# Patient Record
Sex: MALE | Race: BLACK OR AFRICAN AMERICAN | NOT HISPANIC OR LATINO | Employment: FULL TIME | URBAN - METROPOLITAN AREA
[De-identification: names, ages, dates, MRNs, and addresses within clinical notes are randomized per-mention and may not be internally consistent; named-entity substitution may affect disease eponyms.]

---

## 2022-12-23 ENCOUNTER — OFFICE VISIT (OUTPATIENT)
Dept: URGENT CARE | Facility: CLINIC | Age: 22
End: 2022-12-23
Payer: COMMERCIAL

## 2022-12-23 VITALS
TEMPERATURE: 98 F | HEART RATE: 64 BPM | WEIGHT: 124 LBS | OXYGEN SATURATION: 100 % | HEIGHT: 66 IN | SYSTOLIC BLOOD PRESSURE: 135 MMHG | DIASTOLIC BLOOD PRESSURE: 84 MMHG | RESPIRATION RATE: 18 BRPM | BODY MASS INDEX: 19.93 KG/M2

## 2022-12-23 DIAGNOSIS — S05.02XA ABRASION OF LEFT CORNEA, INITIAL ENCOUNTER: Primary | ICD-10-CM

## 2022-12-23 PROCEDURE — 99204 OFFICE O/P NEW MOD 45 MIN: CPT | Mod: PBBFAC | Performed by: FAMILY MEDICINE

## 2022-12-23 PROCEDURE — 99214 PR OFFICE/OUTPT VISIT, EST, LEVL IV, 30-39 MIN: ICD-10-PCS | Mod: S$PBB,,, | Performed by: FAMILY MEDICINE

## 2022-12-23 PROCEDURE — 99214 OFFICE O/P EST MOD 30 MIN: CPT | Mod: S$PBB,,, | Performed by: FAMILY MEDICINE

## 2022-12-23 RX ORDER — CIPROFLOXACIN HYDROCHLORIDE 3 MG/ML
1 SOLUTION/ DROPS OPHTHALMIC EVERY 4 HOURS
Qty: 5 ML | Refills: 0 | Status: SHIPPED | OUTPATIENT
Start: 2022-12-23

## 2022-12-23 NOTE — PROGRESS NOTES
"Subjective:       Patient ID: Daniele Vernon is a 22 y.o. male.    Chief Complaint: Eye Pain (Scratched eye today, states feels like something is in his eye)      HPI  21 yo male with left eye pain since earlier today.  Scratched his eye with phone .  Foreign body sensation.  Difficult to keep eye open.    Review of Systems   Eyes:         As above       Objective:       Vital Signs  Temp: 98.4 °F (36.9 °C)  Pulse: 64  Resp: 18  SpO2: 100 %  BP: 135/84  BP Location: Right arm  Patient Position: Sitting  Height and Weight  Height: 5' 6" (167.6 cm)  Weight: 56.2 kg (124 lb)  BSA (Calculated - sq m): 1.62 sq meters  BMI (Calculated): 20  Weight in (lb) to have BMI = 25: 154.6]  Physical Exam  Vitals reviewed.   Constitutional:       Appearance: Normal appearance.   HENT:      Head: Normocephalic and atraumatic.   Eyes:      Extraocular Movements: Extraocular movements intact.      Comments: Left eye displays injected sclera.  Examination limited by patient cooperation.  No foreign bodies upon cursory inspection.    Skin:     General: Skin is warm and dry.   Neurological:      Mental Status: He is alert.   Psychiatric:         Mood and Affect: Mood normal.         Behavior: Behavior normal.       Assessment:       Problem List Items Addressed This Visit    None  Visit Diagnoses       Abrasion of left cornea, initial encounter    -  Primary    Relevant Medications    ciprofloxacin HCl (CILOXAN) 0.3 % ophthalmic solution              Plan:   Encouraged to use lubricating eye gel as needed before sleep  Strict ER precautions for changes in vision or worsening of symptoms  FU with PCP   "

## 2022-12-23 NOTE — LETTER
December 23, 2022    Daniele Vernon  [address]  [facility] [address]             [facility]  Urgent Care  [address] [facility] [address]  Phone: [phone]   December 23, 2022     Patient: Daniele Vernon   YOB: 2000   Date of Visit: 12/23/2022       To Whom it May Concern:    Daniele Vernon was seen in my clinic on 12/23/2022. He may return to work on 12/25/2022.    Please excuse him from any classes or work missed.    If you have any questions or concerns, please don't hesitate to call.    Sincerely,         Corey Cortez MD

## 2025-02-26 ENCOUNTER — HOSPITAL ENCOUNTER (EMERGENCY)
Facility: HOSPITAL | Age: 25
Discharge: PSYCHIATRIC OR SUBSTANCE USE FACILITY | End: 2025-02-26
Attending: EMERGENCY MEDICINE
Payer: COMMERCIAL

## 2025-02-26 ENCOUNTER — HOSPITAL ENCOUNTER (INPATIENT)
Facility: HOSPITAL | Age: 25
LOS: 6 days | Discharge: HOME | DRG: 881 | End: 2025-03-04
Attending: PSYCHIATRY & NEUROLOGY | Admitting: PSYCHIATRY & NEUROLOGY
Payer: COMMERCIAL

## 2025-02-26 VITALS
OXYGEN SATURATION: 100 % | TEMPERATURE: 98 F | SYSTOLIC BLOOD PRESSURE: 140 MMHG | DIASTOLIC BLOOD PRESSURE: 76 MMHG | BODY MASS INDEX: 20.04 KG/M2 | HEART RATE: 88 BPM | HEIGHT: 66 IN | WEIGHT: 124.69 LBS | RESPIRATION RATE: 18 BRPM

## 2025-02-26 DIAGNOSIS — F32.A DEPRESSION: ICD-10-CM

## 2025-02-26 DIAGNOSIS — R45.851 SUICIDAL IDEATION: Primary | ICD-10-CM

## 2025-02-26 LAB
ACCEPTIBLE SP GR UR QL: 1.02 (ref 1–1.03)
ALBUMIN SERPL-MCNC: 4.6 G/DL (ref 3.5–5)
ALBUMIN/GLOB SERPL: 1.7 RATIO (ref 1.1–2)
ALP SERPL-CCNC: 62 UNIT/L (ref 40–150)
ALT SERPL-CCNC: 21 UNIT/L (ref 0–55)
AMPHET UR QL SCN: NEGATIVE
ANION GAP SERPL CALC-SCNC: 6 MEQ/L
APAP SERPL-MCNC: <3 UG/ML (ref 10–30)
AST SERPL-CCNC: 22 UNIT/L (ref 5–34)
BACTERIA #/AREA URNS AUTO: ABNORMAL /HPF
BARBITURATE SCN PRESENT UR: NEGATIVE
BASOPHILS # BLD AUTO: 0.06 X10(3)/MCL
BASOPHILS NFR BLD AUTO: 0.7 %
BENZODIAZ UR QL SCN: NEGATIVE
BILIRUB SERPL-MCNC: 0.5 MG/DL
BILIRUB UR QL STRIP.AUTO: NEGATIVE
BUN SERPL-MCNC: 14.3 MG/DL (ref 8.9–20.6)
CALCIUM SERPL-MCNC: 9.2 MG/DL (ref 8.4–10.2)
CANNABINOIDS UR QL SCN: NEGATIVE
CHLORIDE SERPL-SCNC: 108 MMOL/L (ref 98–107)
CLARITY UR: ABNORMAL
CO2 SERPL-SCNC: 27 MMOL/L (ref 22–29)
COCAINE UR QL SCN: NEGATIVE
COLOR UR AUTO: ABNORMAL
CREAT SERPL-MCNC: 0.83 MG/DL (ref 0.72–1.25)
CREAT/UREA NIT SERPL: 17
EOSINOPHIL # BLD AUTO: 0.03 X10(3)/MCL (ref 0–0.9)
EOSINOPHIL NFR BLD AUTO: 0.4 %
ERYTHROCYTE [DISTWIDTH] IN BLOOD BY AUTOMATED COUNT: 12 % (ref 11.5–17)
ETHANOL SERPL-MCNC: <10 MG/DL
FENTANYL UR QL SCN: NEGATIVE
GFR SERPLBLD CREATININE-BSD FMLA CKD-EPI: >60 ML/MIN/1.73/M2
GLOBULIN SER-MCNC: 2.7 GM/DL (ref 2.4–3.5)
GLUCOSE SERPL-MCNC: 88 MG/DL (ref 74–100)
GLUCOSE UR QL STRIP: NORMAL
HCT VFR BLD AUTO: 42.6 % (ref 42–52)
HGB BLD-MCNC: 14.5 G/DL (ref 14–18)
HGB UR QL STRIP: NEGATIVE
HYALINE CASTS #/AREA URNS LPF: ABNORMAL /LPF
IMM GRANULOCYTES # BLD AUTO: 0.03 X10(3)/MCL (ref 0–0.04)
IMM GRANULOCYTES NFR BLD AUTO: 0.4 %
KETONES UR QL STRIP: NEGATIVE
LEUKOCYTE ESTERASE UR QL STRIP: NEGATIVE
LYMPHOCYTES # BLD AUTO: 1.81 X10(3)/MCL (ref 0.6–4.6)
LYMPHOCYTES NFR BLD AUTO: 22.3 %
MCH RBC QN AUTO: 29.5 PG (ref 27–31)
MCHC RBC AUTO-ENTMCNC: 34 G/DL (ref 33–36)
MCV RBC AUTO: 86.8 FL (ref 80–94)
MDMA UR QL SCN: NEGATIVE
MONOCYTES # BLD AUTO: 0.38 X10(3)/MCL (ref 0.1–1.3)
MONOCYTES NFR BLD AUTO: 4.7 %
MUCOUS THREADS URNS QL MICRO: ABNORMAL /LPF
NEUTROPHILS # BLD AUTO: 5.79 X10(3)/MCL (ref 2.1–9.2)
NEUTROPHILS NFR BLD AUTO: 71.5 %
NITRITE UR QL STRIP: NEGATIVE
NRBC BLD AUTO-RTO: 0 %
OPIATES UR QL SCN: NEGATIVE
PCP UR QL: NEGATIVE
PH UR STRIP: 7.5 [PH]
PH UR: 7.5 [PH] (ref 3–11)
PLATELET # BLD AUTO: 244 X10(3)/MCL (ref 130–400)
PMV BLD AUTO: 9.8 FL (ref 7.4–10.4)
POTASSIUM SERPL-SCNC: 3.5 MMOL/L (ref 3.5–5.1)
PROT SERPL-MCNC: 7.3 GM/DL (ref 6.4–8.3)
PROT UR QL STRIP: NEGATIVE
RBC # BLD AUTO: 4.91 X10(6)/MCL (ref 4.7–6.1)
RBC #/AREA URNS AUTO: ABNORMAL /HPF
SALICYLATES SERPL-MCNC: <5 MG/DL (ref 15–30)
SODIUM SERPL-SCNC: 141 MMOL/L (ref 136–145)
SP GR UR STRIP.AUTO: 1.02 (ref 1–1.03)
SQUAMOUS #/AREA URNS LPF: ABNORMAL /HPF
UROBILINOGEN UR STRIP-ACNC: NORMAL
WBC # BLD AUTO: 8.1 X10(3)/MCL (ref 4.5–11.5)
WBC #/AREA URNS AUTO: ABNORMAL /HPF

## 2025-02-26 PROCEDURE — 99285 EMERGENCY DEPT VISIT HI MDM: CPT

## 2025-02-26 PROCEDURE — 80307 DRUG TEST PRSMV CHEM ANLYZR: CPT | Performed by: EMERGENCY MEDICINE

## 2025-02-26 PROCEDURE — 11400000 HC PSYCH PRIVATE ROOM

## 2025-02-26 PROCEDURE — 80179 DRUG ASSAY SALICYLATE: CPT | Performed by: EMERGENCY MEDICINE

## 2025-02-26 PROCEDURE — 81001 URINALYSIS AUTO W/SCOPE: CPT | Performed by: EMERGENCY MEDICINE

## 2025-02-26 PROCEDURE — 80053 COMPREHEN METABOLIC PANEL: CPT | Performed by: EMERGENCY MEDICINE

## 2025-02-26 PROCEDURE — 80143 DRUG ASSAY ACETAMINOPHEN: CPT | Performed by: EMERGENCY MEDICINE

## 2025-02-26 PROCEDURE — 82077 ASSAY SPEC XCP UR&BREATH IA: CPT | Performed by: EMERGENCY MEDICINE

## 2025-02-26 PROCEDURE — 85025 COMPLETE CBC W/AUTO DIFF WBC: CPT | Performed by: EMERGENCY MEDICINE

## 2025-02-26 RX ORDER — LORAZEPAM 2 MG/ML
2 INJECTION INTRAMUSCULAR EVERY 6 HOURS PRN
Status: DISCONTINUED | OUTPATIENT
Start: 2025-02-26 | End: 2025-03-04 | Stop reason: HOSPADM

## 2025-02-26 RX ORDER — ACETAMINOPHEN 325 MG/1
650 TABLET ORAL EVERY 6 HOURS PRN
Status: DISCONTINUED | OUTPATIENT
Start: 2025-02-26 | End: 2025-03-04 | Stop reason: HOSPADM

## 2025-02-26 RX ORDER — ALUMINUM HYDROXIDE, MAGNESIUM HYDROXIDE, AND SIMETHICONE 1200; 120; 1200 MG/30ML; MG/30ML; MG/30ML
30 SUSPENSION ORAL EVERY 6 HOURS PRN
Status: DISCONTINUED | OUTPATIENT
Start: 2025-02-26 | End: 2025-03-04 | Stop reason: HOSPADM

## 2025-02-26 RX ORDER — IBUPROFEN 200 MG
1 TABLET ORAL DAILY
Status: DISCONTINUED | OUTPATIENT
Start: 2025-02-26 | End: 2025-03-04 | Stop reason: HOSPADM

## 2025-02-26 RX ORDER — DIPHENHYDRAMINE HYDROCHLORIDE 50 MG/ML
50 INJECTION, SOLUTION INTRAMUSCULAR; INTRAVENOUS EVERY 6 HOURS PRN
Status: DISCONTINUED | OUTPATIENT
Start: 2025-02-26 | End: 2025-03-04 | Stop reason: HOSPADM

## 2025-02-26 RX ORDER — HYDROXYZINE HYDROCHLORIDE 50 MG/1
50 TABLET, FILM COATED ORAL EVERY 4 HOURS PRN
Status: DISCONTINUED | OUTPATIENT
Start: 2025-02-26 | End: 2025-03-04 | Stop reason: HOSPADM

## 2025-02-26 RX ORDER — LORAZEPAM 1 MG/1
2 TABLET ORAL EVERY 6 HOURS PRN
Status: DISCONTINUED | OUTPATIENT
Start: 2025-02-26 | End: 2025-03-04 | Stop reason: HOSPADM

## 2025-02-26 RX ORDER — HALOPERIDOL 5 MG/1
5 TABLET ORAL EVERY 6 HOURS PRN
Status: DISCONTINUED | OUTPATIENT
Start: 2025-02-26 | End: 2025-03-04 | Stop reason: HOSPADM

## 2025-02-26 RX ORDER — DIPHENHYDRAMINE HCL 50 MG
50 CAPSULE ORAL EVERY 6 HOURS PRN
Status: DISCONTINUED | OUTPATIENT
Start: 2025-02-26 | End: 2025-03-04 | Stop reason: HOSPADM

## 2025-02-26 RX ORDER — TRAZODONE HYDROCHLORIDE 100 MG/1
100 TABLET ORAL NIGHTLY PRN
Status: DISCONTINUED | OUTPATIENT
Start: 2025-02-26 | End: 2025-03-04 | Stop reason: HOSPADM

## 2025-02-26 RX ORDER — CLONIDINE HYDROCHLORIDE 0.1 MG/1
0.1 TABLET ORAL EVERY 8 HOURS PRN
Status: DISCONTINUED | OUTPATIENT
Start: 2025-02-26 | End: 2025-03-04 | Stop reason: HOSPADM

## 2025-02-26 RX ORDER — HALOPERIDOL LACTATE 5 MG/ML
5 INJECTION, SOLUTION INTRAMUSCULAR EVERY 6 HOURS PRN
Status: DISCONTINUED | OUTPATIENT
Start: 2025-02-26 | End: 2025-03-04 | Stop reason: HOSPADM

## 2025-02-26 RX ORDER — CLONIDINE HYDROCHLORIDE 0.1 MG/1
0.2 TABLET ORAL EVERY 8 HOURS PRN
Status: DISCONTINUED | OUTPATIENT
Start: 2025-02-26 | End: 2025-03-04 | Stop reason: HOSPADM

## 2025-02-26 RX ORDER — ONDANSETRON 4 MG/1
4 TABLET, ORALLY DISINTEGRATING ORAL EVERY 6 HOURS PRN
Status: DISCONTINUED | OUTPATIENT
Start: 2025-02-26 | End: 2025-03-04 | Stop reason: HOSPADM

## 2025-02-26 RX ORDER — ADHESIVE BANDAGE
30 BANDAGE TOPICAL DAILY PRN
Status: DISCONTINUED | OUTPATIENT
Start: 2025-02-26 | End: 2025-03-04 | Stop reason: HOSPADM

## 2025-02-26 NOTE — PLAN OF CARE
Problem: Adult Behavioral Health Plan of Care  Goal: Plan of Care Review  Outcome: Not Progressing  Flowsheets (Taken 2/26/2025 1544)  Patient Agreement with Plan of Care: unable to participate  Plan of Care Reviewed With: patient  Goal: Patient-Specific Goal (Individualization)  Outcome: Not Progressing  Flowsheets (Taken 2/26/2025 1544)  Patient Personal Strengths: independent living skills  Patient Vulnerabilities: family/relationship conflict  Goal: Adheres to Safety Considerations for Self and Others  Outcome: Not Progressing  Flowsheets (Taken 2/26/2025 1544)  Adheres to Safety Considerations for Self and Others: unable to achieve outcome  Intervention: Develop and Maintain Individualized Safety Plan  Flowsheets (Taken 2/26/2025 1544)  Safety Measures: safety rounds completed  Goal: Absence of New-Onset Illness or Injury  Outcome: Not Progressing  Intervention: Identify and Manage Fall Risk  Flowsheets (Taken 2/26/2025 1544)  Safety Measures: safety rounds completed  Intervention: Prevent VTE (Venous Thromboembolism)  Flowsheets (Taken 2/26/2025 1544)  VTE Prevention/Management: fluids promoted  Intervention: Prevent Infection  Flowsheets (Taken 2/26/2025 1544)  Infection Prevention: rest/sleep promoted  Goal: Optimized Coping Skills in Response to Life Stressors  Outcome: Not Progressing  Flowsheets (Taken 2/26/2025 1544)  Optimized Coping Skills in Response to Life Stressors: unable to achieve outcome  Intervention: Promote Effective Coping Strategies  Flowsheets (Taken 2/26/2025 1544)  Supportive Measures: self-care encouraged  Goal: Develops/Participates in Therapeutic [place] to Support Successful Transition  Outcome: Not Progressing  Flowsheets (Taken 2/26/2025 1544)  Develops/Participates in Therapeutic [place] to Support Successful Transition: unable to achieve outcome  Intervention: Foster Therapeutic [place]  Flowsheets (Taken 2/26/2025 1544)  Trust Relationship/Rapport: care explained  Goal:  Rounds/Family Conference  Outcome: Not Progressing  Flowsheets (Taken 2/26/2025 1544)  Participants:   nursing   milieu/psych techs     Problem: Violence Risk or Actual  Goal: Anger and Impulse Control  Outcome: Not Progressing  Intervention: Minimize Safety Risk  Flowsheets (Taken 2/26/2025 1544)  Behavior Management: behavioral plan developed  Sensory Stimulation Regulation: quiet environment promoted  De-Escalation Techniques: quiet time facilitated  Intervention: Promote Self-Control  Flowsheets (Taken 2/26/2025 1544)  Supportive Measures: self-care encouraged  Environmental Support: calm environment promoted     Problem: Depressive Signs/Symptoms  Goal: Optimized Energy Level (Depressive Signs/Symptoms)  Outcome: Not Progressing  Flowsheets (Taken 2/26/2025 1544)  Mutually Determined Action Steps (Optimized Energy Level): grooms self without prompting  Intervention: Optimize Energy Level  Flowsheets (Taken 2/26/2025 1544)  Activity (Behavioral Health): up ad samuel  Patient Performed Hygiene: teeth brushed  Diversional Activity: television  Goal: Optimized Cognitive Function (Depressive Signs/Symptoms)  Outcome: Not Progressing  Flowsheets (Taken 2/26/2025 1544)  Mutually Determined Action Steps (Optimized Cognitive Function): participates in attention training  Goal: Increased Participation and Engagement (Depressive Signs/Symptoms)  Outcome: Not Progressing  Flowsheets (Taken 2/26/2025 1544)  Mutually Determined Action Steps (Increased Participation and Engagement): participates in one or more activity  Intervention: Facilitate Participation and Engagement  Flowsheets (Taken 2/26/2025 1544)  Supportive Measures: self-care encouraged  Diversional Activity: television  Goal: Enhanced Self-Esteem and Confidence (Depressive Signs/Symptoms)  Outcome: Not Progressing  Flowsheets (Taken 2/26/2025 1544)  Mutually Determined Action Steps (Enhanced Self-Esteem and Confidence): identifies judgmental thoughts  Intervention:  Promote Confidence and Self-Esteem  Flowsheets (Taken 2/26/2025 1544)  Supportive Measures: self-care encouraged  Goal: Improved Mood Symptoms (Depressive Signs/Symptoms)  Outcome: Not Progressing  Flowsheets (Taken 2/26/2025 1544)  Mutually Determined Action Steps (Improved Mood Symptoms): acknowledges progress  Intervention: Promote Mood Improvement  Flowsheets (Taken 2/26/2025 1544)  Supportive Measures: self-care encouraged  Diversional Activity: television  Goal: Optimized Nutrition Intake (Depressive Signs/Symptoms)  Outcome: Not Progressing  Flowsheets (Taken 2/26/2025 1544)  Mutually Determined Action Steps (Optimized Nutrition Intake): eats at meal time  Intervention: Promote Optimal Nutrition Intake  Flowsheets (Taken 2/26/2025 1544)  Nutrition Interventions: food preferences provided  Bowel Elimination Promotion: adequate fluid intake promoted  Goal: Improved Psychomotor Symptoms (Depressive Signs/Symptoms)  Outcome: Not Progressing  Flowsheets (Taken 2/26/2025 1544)  Mutually Determined Action Steps (Improved Psychomotor Symptoms): exhibits decrease in agitation  Intervention: Manage Psychomotor Movement  Flowsheets (Taken 2/26/2025 1544)  Activity (Behavioral Health): up ad samuel  Patient Performed Hygiene: teeth brushed  Diversional Activity: television  Goal: Improved Sleep (Depressive Signs/Symptoms)  Outcome: Not Progressing  Flowsheets (Taken 2/26/2025 1544)  Mutually Determined Action Steps (Improved Sleep): sleeps 4-6 hours at night  Intervention: Promote Healthy Sleep Hygiene  Flowsheets (Taken 2/26/2025 1544)  Sleep Hygiene Promotion: regular sleep pattern promoted  Goal: Enhanced Social, Occupational or Functional Skills (Depressive Signs/Symptoms)  Outcome: Not Progressing  Flowsheets (Taken 2/26/2025 1544)  Mutually Determined Action Steps (Enhanced Social, Occupational or Functional Skills): participates in social skills training  Intervention: Promote Social, Occupational and Functional  Ability  Flowsheets (Taken 2/26/2025 3348)  Social Functional Ability Promotion: autonomy promoted

## 2025-02-26 NOTE — NURSING
"Admission Note:    Daniele Vernon is a 24 y.o. male, : 2000, MRN: [id number], admitted on 2025 to [facility] ([facility]) for Pierre Roy MD with a diagnosis of Depression [F32.A]. Patient admitted on a status of Physician Emergency Certificate (PEC). Daniele reports no known food or drug allergies.    Patient demonstrated an affect that was sad, flat, anxious, and irritable. Patient demonstrated mood during assessment that was depressed, anxious, and swings. Patient had an appearance that was clean.  Patient endorses suicidal ideation. Patient denies suicide plan. Patient denies hallucinations. He is a PEC from [facility] ED. Presents with a depressed mood with SI (no plan). UDS negative. Cooperative on admit.     Alyssas  height is 5' 6" (1.676 m) and weight is 54.4 kg (120 lb). His oral temperature is 97.9 °F (36.6 °C). His blood pressure is 145/83 (abnormal) and his pulse is 75. His respiration is 20 and oxygen saturation is 100%.     Daniele's last BM was noted on: 25.    Metal detector screening performed via security personnel. The result of the scan was no contraband found. Head-to-toe physical assessment completed with the following findings: no wounds found upon body screen. A full skin assessment was performed. Alyssas skin appeared warm,dry, and intact.  Daniele was oriented to unit, staff, peers, and room. Patient belongings/valuables stored in locked intake room cabinet and changes of clothing provided to patient. Daniele was placed on Q 15 min observations.       "

## 2025-02-26 NOTE — ED PROVIDER NOTES
Encounter Date: 2/26/2025       History     Chief Complaint   Patient presents with    Psychiatric Evaluation     Here for psych Eval; +SI, no plan, denies HI, AH, VH     Patient is here with suicidal ideation, no clearly developed plan though some guarding.  Patient acknowledges hopelessness, or orientation toward future.  Patient does not appear intoxicated and was not responsive to internal stimuli.  There is no obvious paranoia nor delusions.  Patient with some affect of constriction.        Review of patient's allergies indicates:  No Known Allergies  History reviewed. No pertinent past medical history.  History reviewed. No pertinent surgical history.  No family history on file.  Social History[1]  Review of Systems    Physical Exam     Initial Vitals [02/26/25 1151]   BP Pulse Resp Temp SpO2   (!) 173/76 93 16 98.2 °F (36.8 °C) 100 %      MAP       --         Physical Exam    Nursing note and vitals reviewed.  Constitutional: He appears well-developed and well-nourished. He is not diaphoretic. No distress.   HENT:   Head: Normocephalic and atraumatic.   Eyes: EOM are normal. Pupils are equal, round, and reactive to light. Right eye exhibits no discharge. Left eye exhibits no discharge.   Neck: Neck supple. No thyromegaly present. No tracheal deviation present. No JVD present.   Normal range of motion.  Cardiovascular:  Normal rate, regular rhythm, normal heart sounds and intact distal pulses.           No murmur heard.  Pulmonary/Chest: Breath sounds normal. No stridor. No respiratory distress. He has no wheezes. He has no rhonchi. He has no rales.   Abdominal: Abdomen is soft. He exhibits no distension. There is no abdominal tenderness. There is no rebound and no guarding.   Musculoskeletal:         General: No tenderness or edema. Normal range of motion.      Cervical back: Normal range of motion and neck supple.     Neurological: He is alert and oriented to person, place, and time. He has normal strength.  No cranial nerve deficit. GCS score is 15. GCS eye subscore is 4. GCS verbal subscore is 5. GCS motor subscore is 6.   Skin: Skin is warm and dry. Capillary refill takes less than 2 seconds. No rash and no abscess noted. No erythema. No pallor.   Psychiatric:   Affect of flattening, suicidal ideation, impaired impulse control and judgment;         ED Course   Procedures  Labs Reviewed   URINALYSIS, REFLEX TO URINE CULTURE - Abnormal       Result Value    Color, UA Light-Yellow      Appearance, UA Turbid (*)     Specific Gravity, UA 1.022      pH, UA 7.5      Protein, UA Negative      Glucose, UA Normal      Ketones, UA Negative      Blood, UA Negative      Bilirubin, UA Negative      Urobilinogen, UA Normal      Nitrites, UA Negative      Leukocyte Esterase, UA Negative      RBC, UA 0-5      WBC, UA 0-5      Bacteria, UA None Seen      Squamous Epithelial Cells, UA None Seen      Mucous, UA Trace (*)     Hyaline Casts, UA None Seen     SALICYLATE LEVEL - Abnormal    Salicylate Level <5.0 (*)    ACETAMINOPHEN LEVEL - Abnormal    Acetaminophen Level <3.0 (*)    COMPREHENSIVE METABOLIC PANEL - Abnormal    Sodium 141      Potassium 3.5      Chloride 108 (*)     CO2 27      Glucose 88      Blood Urea Nitrogen 14.3      Creatinine 0.83      Calcium 9.2      Protein Total 7.3      Albumin 4.6      Globulin 2.7      Albumin/Globulin Ratio 1.7      Bilirubin Total 0.5      ALP 62      ALT 21      AST 22      eGFR >60      Anion Gap 6.0      BUN/Creatinine Ratio 17     DRUG SCREEN, URINE (BEAKER) - Normal    Amphetamines, Urine Negative      Barbiturates, Urine Negative      Benzodiazepine, Urine Negative      Cannabinoids, Urine Negative      Cocaine, Urine Negative      Fentanyl, Urine Negative      MDMA, Urine Negative      Opiates, Urine Negative      Phencyclidine, Urine Negative      pH, Urine 7.5      Specific Gravity, Urine Auto 1.022      Narrative:     Cut off concentrations:    Amphetamines - 1000  ng/ml  Barbiturates - 200 ng/ml  Benzodiazepine - 200 ng/ml  Cannabinoids (THC) - 50 ng/ml  Cocaine - 300 ng/ml  Fentanyl - 1.0 ng/ml  MDMA - 500 ng/ml  Opiates - 300 ng/ml   Phencyclidine (PCP) - 25 ng/ml    Specimen submitted for drug analysis and tested for pH and specific gravity in order to evaluate sample integrity. Suspect tampering if specific gravity is <1.003 and/or pH is not within the range of 4.5 - 8.0  False negatives may result form substances such as bleach added to urine.  False positives may result for the presence of a substance with similar chemical structure to the drug or its metabolite.    This test provides only a PRELIMINARY analytical test result. A more specific alternate chemical method must be used in order to obtain a confirmed analytical result. Gas chromatography/mass spectrometry (GC/MS) is the preferred confirmatory method. Other chemical confirmation methods are available. Clinical consideration and professional judgement should be applied to any drug of abuse test result, particularly when preliminary positive results are used.    Positive results will be confirmed only at the physicians request. Unconfirmed screening results are to be used only for medical purposes (treatment).        ALCOHOL,MEDICAL (ETHANOL) - Normal    Ethanol Level <10.0     CBC W/ AUTO DIFFERENTIAL    Narrative:     The following orders were created for panel order CBC Auto Differential.  Procedure                               Abnormality         Status                     ---------                               -----------         ------                     CBC with Differential[[id number]]                           Final result                 Please view results for these tests on the individual orders.   CBC WITH DIFFERENTIAL    WBC 8.10      RBC 4.91      Hgb 14.5      Hct 42.6      MCV 86.8      MCH 29.5      MCHC 34.0      RDW 12.0      Platelet 244      MPV 9.8      Neut % 71.5      Lymph % 22.3       Mono % 4.7      Eos % 0.4      Basophil % 0.7      Imm Grans % 0.4      Neut # 5.79      Lymph # 1.81      Mono # 0.38      Eos # 0.03      Baso # 0.06      Imm Gran # 0.03      NRBC% 0.0            Imaging Results    None          Medications - No data to display  Medical Decision Making  Features most compatible with a major depression and accompanying suicidal ideation    Amount and/or Complexity of Data Reviewed  External Data Reviewed: notes.  Labs: ordered. Decision-making details documented in ED Course.     Details: As above;  Discussion of management or test interpretation with external provider(s): Formal mental health evaluation;    Risk  Decision regarding hospitalization.  Risk Details: Risk found sufficient to warrant formal mental health evaluation.  Patient achieves medical clearance and is referred for psychiatry services.               ED Course as of 02/26/25 1559   Wed Feb 26, 2025   1251 Reassuring hemogram; [CT]   1251 Reassuring chemistries; [CT]   1252 Negative Tylenol, ethanol, salicylate levels; [CT]      ED Course User Index  [CT] Jose Gallagher MD       Medically cleared for psychiatry placement: 2/26/2025 12:36 PM                   Clinical Impression:  Final diagnoses:  [R45.851] Suicidal ideation (Primary)          ED Disposition Condition    Transfer to Psych Facility Stable          ED Prescriptions    None       Follow-up Information    None              Jose Gallagher MD  02/26/25 1236       [1]   Social History  Tobacco Use    Smoking status: Never    Smokeless tobacco: Never   Substance Use Topics    Alcohol use: Never    Drug use: Never        Jose Gallagher MD  02/26/25 6582

## 2025-02-26 NOTE — PROGRESS NOTES
02/26/25 1500   [facility] Group Therapy   Group Name Therapeutic Recreation   Specific Interventions Skilled Activity Creative Expression   Participation Level None   Participation Quality Conflict w/ Other;Services

## 2025-02-27 LAB
CHOLEST SERPL-MCNC: 160 MG/DL
CHOLEST/HDLC SERPL: 2 {RATIO} (ref 0–5)
EST. AVERAGE GLUCOSE BLD GHB EST-MCNC: 108.3 MG/DL
HBA1C MFR BLD: 5.4 %
HDLC SERPL-MCNC: 67 MG/DL (ref 35–60)
LDLC SERPL CALC-MCNC: 85 MG/DL (ref 50–140)
T PALLIDUM AB SER QL: NONREACTIVE
TRIGL SERPL-MCNC: 42 MG/DL (ref 34–140)
VLDLC SERPL CALC-MCNC: 8 MG/DL

## 2025-02-27 PROCEDURE — 86780 TREPONEMA PALLIDUM: CPT | Performed by: PSYCHIATRY & NEUROLOGY

## 2025-02-27 PROCEDURE — 80061 LIPID PANEL: CPT | Performed by: PSYCHIATRY & NEUROLOGY

## 2025-02-27 PROCEDURE — 11400000 HC PSYCH PRIVATE ROOM

## 2025-02-27 PROCEDURE — 83036 HEMOGLOBIN GLYCOSYLATED A1C: CPT | Performed by: PSYCHIATRY & NEUROLOGY

## 2025-02-27 PROCEDURE — 36415 COLL VENOUS BLD VENIPUNCTURE: CPT | Performed by: PSYCHIATRY & NEUROLOGY

## 2025-02-27 NOTE — PLAN OF CARE
Problem: Adult Behavioral Health Plan of Care  Goal: Plan of Care Review  Outcome: Not Progressing  Flowsheets (Taken 2/27/2025 1018)  Patient Agreement with Plan of Care: agrees  Plan of Care Reviewed With: patient   Upon admission SW/CW reviewed pt care plan during psychosocial assessment and pt verbalized understanding of care plan.

## 2025-02-27 NOTE — PLAN OF CARE
Behavioral Health Unit  Psychosocial History and Assessment  Progress Note      Patient Name: Daniele Vernon YOB: 2000 SW: Yamileth Maza Date: 2/27/2025    Chief Complaint: depression and suicidal ideation    Consent:     Did the patient consent for an interview with the ? Yes    Did the patient consent for the  to contact family/friend/caregiver?   Yes  Name: Aislinn Vernon, Relationship: mother, and Contact: [phone]    Did the patient give consent for the  to inform family/friend/caregiver of his/her whereabouts or to discuss discharge planning? Yes    Source of Information: Face to face with patient    Is information obtained from interviews considered reliable?   yes    Reason for Admission:     There are no hospital problems to display for this patient.      History of Present Illness - (Patient Perception):   I told my girlfriend that I was going to kill myself because we had an argument and I was bairon upset.     Present biopsychosocial functioning: calm, cooperative    Past biopsychosocial functioning: hx of depression    Family and Marital/Relationship History:     Significant Other/Partner Relationships:  Partnered: 2 months; no children    Family Relationships: Intact      Childhood History:     Where was [place] raised? [place]    Who raised the patient? Mom and dad       How does patient describe their childhood? It was good      Who is patient's primary support person? I talk to God sometimes      Culture and Shinto:     Shinto: Zoroastrian    How strong of a role does Latter-day and spirituality play in patient's life? strong    Advent or spiritual concerns regarding treatment: observation of holy days  and spiritual concerns / distress    History of Abuse:   History of Abuse: Denies      Outcome: N/A    Psychiatric and Medical History:     History of psychiatric illness or treatment: none    Medical history: No past medical history on  file.    Substance Abuse History:     Alcohol - (Patient Perspective): pt states that he does not drink alcohol  Social History     Substance and Sexual Activity   Alcohol Use Never       Drugs - (Patient Perspective): pt states that he does not use drugs  Social History     Substance and Sexual Activity   Drug Use Never       Additional Comments: pt states that he does not smoke cigarettes    Education:     Currently Enrolled? No  pt stated that he graduated high schoold    Special Education? No    Interested in Completing Education/GED: No    Employment and Financial:     Currently employed? Employed: Current Occupation: [employer]    Source of Income: salary    Able to afford basic needs (food, shelter, utilities)? Yes    Who manages finances/personal affairs? self      Service:     [place]? no    Combat Service? No     Community Resources:     Describe present use of community resources: inpatient bh     Identify previously used community resources   (Include previous mental health treatment - outpatient and inpatient): none    Environmental:     Current living situation:Lives with parents    Social Evaluation:     Patient Assets: General fund of knowledge, Supportive family/friends, Work skills, and Worship affliation    Patient Limitations: poor coping skills    High risk psychosocial issues that may impact discharge planning:   None at this time    Recommendations:     Anticipated discharge plan:   outpatient follow up  [address] [place]    Community resources needed for discharge planning:  aftercare treatment sources    Anticipated social work role(s) in treatment and discharge planning: advice and [place], groups, individual as needed and referral to aftercare.   02/27/25 0952   Initial Information   Source of Information patient   Reason for Admission depression, SI   Patient Aware of Diagnosis yes   Arrived From emergency department   Spiritual Beliefs   Spiritual, Cultural Beliefs,  Anglican Practices, Values that Affect Care yes   Description of Beliefs that Will Affect Care Hoahaoism   Substance Use/Withdrawal   Substance Use Denies use   Additional Tobacco Use   How many cigarettes do you typically have per day? 0   Abuse Screen (yes response referral indicated)   Feels Unsafe at Home or Work/School no   Feels Threatened by Someone no   Does anyone try to keep you from having contact with others or doing things outside your home? no   Physical Signs of Abuse Present no   Abuse Details   Physical Abuse No   Sexual Abuse No   Emotional Abuse No   If applicable, has the abuse been reported? No   AUDIT-C (Alcohol Use Disorders ID Test)   Alcohol Use In Past Year 0-->never   Alcohol Amount Per Day In Past Year 0-->none   More Than 6 Drinks On One Occasion In Past Year 0-->never   Total Audit C Score 0

## 2025-02-27 NOTE — H&P
[facility]  History & Physical    Subjective:      Chief Complaint/Reason for Admission: major depression   sent on PEC from [facility]     Daniele Vernon is a 24 y.o. male. Major depression     No past medical history on file.  No past surgical history on file.  No family history on file.  Social History[1]    PTA Medications   Medication Sig    ciprofloxacin HCl (CILOXAN) 0.3 % ophthalmic solution Place 1 drop into the left eye every 4 (four) hours.     Review of patient's allergies indicates:  No Known Allergies     Review of Systems   Constitutional: Negative.    HENT: Negative.     Eyes: Negative.    Respiratory: Negative.     Cardiovascular: Negative.    Gastrointestinal: Negative.    Genitourinary: Negative.    Musculoskeletal: Negative.    Skin: Negative.    Neurological: Negative.    Endo/Heme/Allergies: Negative.    Psychiatric/Behavioral:  Positive for depression. Negative for hallucinations, substance abuse and suicidal ideas.        Objective:      Vital Signs (Most Recent)  Temp: 97.2 °F (36.2 °C) (02/27/25 0701)  Pulse: 67 (02/27/25 0701)  Resp: 19 (02/27/25 0701)  BP: 135/88 (02/27/25 0701)  SpO2: 99 % (02/27/25 0701)    Vital Signs Range (Last 24H):  Temp:  [97.2 °F (36.2 °C)-98.2 °F (36.8 °C)]   Pulse:  [67-88]   Resp:  [18-20]   BP: (135-145)/(76-88)   SpO2:  [99 %-100 %]     Physical Exam  HENT:      Head: Normocephalic.      Right Ear: Tympanic membrane normal.      Left Ear: Tympanic membrane normal.      Nose: Nose normal.      Mouth/Throat:      Mouth: Mucous membranes are moist.   Eyes:      Extraocular Movements: Extraocular movements intact.      Pupils: Pupils are equal, round, and reactive to light.   Cardiovascular:      Rate and Rhythm: Normal rate and regular rhythm.   Pulmonary:      Effort: Pulmonary effort is normal.   Abdominal:      General: Abdomen is flat.   Musculoskeletal:         General: Normal range of motion.   Skin:     General: Skin  is warm.   Neurological:      General: No focal deficit present.      Mental Status: He is alert and oriented to person, place, and time.      Comments: Vision normal   Hearing normal   EOM intact   Face muscles normal  Facial sensation normal   Shrugs shoulders  Tongue midline            Data Review:    Recent Results (from the past 48 hours)   Drug Screen, Urine    Collection Time: 02/26/25 12:13 PM   Result Value Ref Range    Amphetamines, Urine Negative Negative    Barbiturates, Urine Negative Negative    Benzodiazepine, Urine Negative Negative    Cannabinoids, Urine Negative Negative    Cocaine, Urine Negative Negative    Fentanyl, Urine Negative Negative    MDMA, Urine Negative Negative    Opiates, Urine Negative Negative    Phencyclidine, Urine Negative Negative    pH, Urine 7.5 3.0 - 11.0    Specific Gravity, Urine Auto 1.022 1.001 - 1.035   Urinalysis, Reflex to Urine Culture    Collection Time: 02/26/25 12:13 PM    Specimen: Urine   Result Value Ref Range    Color, UA Light-Yellow Yellow, Light-Yellow, Dark Yellow, Cherri, Straw    Appearance, UA Turbid (A) Clear    Specific Gravity, UA 1.022 1.005 - 1.030    pH, UA 7.5 5.0 - 8.5    Protein, UA Negative Negative    Glucose, UA Normal Negative, Normal    Ketones, UA Negative Negative    Blood, UA Negative Negative    Bilirubin, UA Negative Negative    Urobilinogen, UA Normal 0.2, 1.0, Normal    Nitrites, UA Negative Negative    Leukocyte Esterase, UA Negative Negative    RBC, UA 0-5 None Seen, 0-2, 3-5, 0-5 /HPF    WBC, UA 0-5 None Seen, 0-2, 3-5, 0-5 /HPF    Bacteria, UA None Seen None Seen /HPF    Squamous Epithelial Cells, UA None Seen None Seen /HPF    Mucous, UA Trace (A) None Seen /LPF    Hyaline Casts, UA None Seen None Seen /lpf   Ethanol    Collection Time: 02/26/25 12:24 PM   Result Value Ref Range    Ethanol Level <10.0 <=10.0 mg/dL   Salicylate Level    Collection Time: 02/26/25 12:24 PM   Result Value Ref Range    Salicylate Level <5.0 (L) 15.0  - 30.0 mg/dL   Acetaminophen Level    Collection Time: 02/26/25 12:24 PM   Result Value Ref Range    Acetaminophen Level <3.0 (L) 10.0 - 30.0 ug/ml   Comprehensive Metabolic Panel    Collection Time: 02/26/25 12:24 PM   Result Value Ref Range    Sodium 141 136 - 145 mmol/L    Potassium 3.5 3.5 - 5.1 mmol/L    Chloride 108 (H) 98 - 107 mmol/L    CO2 27 22 - 29 mmol/L    Glucose 88 74 - 100 mg/dL    Blood Urea Nitrogen 14.3 8.9 - 20.6 mg/dL    Creatinine 0.83 0.72 - 1.25 mg/dL    Calcium 9.2 8.4 - 10.2 mg/dL    Protein Total 7.3 6.4 - 8.3 gm/dL    Albumin 4.6 3.5 - 5.0 g/dL    Globulin 2.7 2.4 - 3.5 gm/dL    Albumin/Globulin Ratio 1.7 1.1 - 2.0 ratio    Bilirubin Total 0.5 <=1.5 mg/dL    ALP 62 40 - 150 unit/L    ALT 21 0 - 55 unit/L    AST 22 5 - 34 unit/L    eGFR >60 mL/min/1.73/m2    Anion Gap 6.0 mEq/L    BUN/Creatinine Ratio 17    CBC with Differential    Collection Time: 02/26/25 12:24 PM   Result Value Ref Range    WBC 8.10 4.50 - 11.50 x10(3)/mcL    RBC 4.91 4.70 - 6.10 x10(6)/mcL    Hgb 14.5 14.0 - 18.0 g/dL    Hct 42.6 42.0 - 52.0 %    MCV 86.8 80.0 - 94.0 fL    MCH 29.5 27.0 - 31.0 pg    MCHC 34.0 33.0 - 36.0 g/dL    RDW 12.0 11.5 - 17.0 %    Platelet 244 130 - 400 x10(3)/mcL    MPV 9.8 7.4 - 10.4 fL    Neut % 71.5 %    Lymph % 22.3 %    Mono % 4.7 %    Eos % 0.4 %    Basophil % 0.7 %    Imm Grans % 0.4 %    Neut # 5.79 2.1 - 9.2 x10(3)/mcL    Lymph # 1.81 0.6 - 4.6 x10(3)/mcL    Mono # 0.38 0.1 - 1.3 x10(3)/mcL    Eos # 0.03 0 - 0.9 x10(3)/mcL    Baso # 0.06 <=0.2 x10(3)/mcL    Imm Gran # 0.03 0.00 - 0.04 x10(3)/mcL    NRBC% 0.0 %   Hemoglobin A1C    Collection Time: 02/27/25  7:19 AM   Result Value Ref Range    Hemoglobin A1c 5.4 <=7.0 %    Estimated Average Glucose 108.3 mg/dL   Lipid Panel    Collection Time: 02/27/25  7:19 AM   Result Value Ref Range    Cholesterol Total 160 <=200 mg/dL    HDL Cholesterol 67 (H) 35 - 60 mg/dL    Triglyceride 42 34 - 140 mg/dL    Cholesterol/HDL Ratio 2 0 - 5    Very  Low Density Lipoprotein 8     LDL Cholesterol 85.00 50.00 - 140.00 mg/dL        No results found.       Assessment and Plan       Major depression- severe          [1]   Social History  Tobacco Use    Smoking status: Never    Smokeless tobacco: Never   Substance Use Topics    Alcohol use: Never    Drug use: Never

## 2025-02-27 NOTE — H&P
2/27/2025  Daniele Vernon   2000   [id number]            Psychiatry Inpatient Admission Note    Date of Admission: 2/26/2025  3:13 PM    Current Legal Status: Physician's Emergency Certificate    Chief Complaint: I got into an argument with my girlfriend and said I was going to kill myself    SUBJECTIVE:   History of Present Illness:   Daniele Vernon is a 24 y.o. male placed under a Physician's Emergency Certificate at [facility] with suicidal ideations without a plan.     Patient presents to the exam room calm and polite. Patient was withdrawn and quiet but denies any depression, anxiety, thoughts of self harm or harm to others. Patient denies any history of mental illness. Patient denies any history of psychotropic medication or feels the need to begin treatment at this time. Patient endorses that he said he was going to harm himself after the argument with his girlfriend because he was mad. I will admit for safety monitoring.     Past Psychiatric History:   Previous Psychiatric Hospitalizations: Denies   Previous Medication Trials: Denies   Previous Suicide Attempts: Denies    Outpatient psychiatrist: Denies     Current Medications:   Home Psychiatric Meds: Denies     Past Medical/Surgical History:   No past medical history on file.  No past surgical history on file.      Family Psychiatric History:   Denies     Allergies:   Review of patient's allergies indicates:  No Known Allergies    Substance Abuse History:   Tobacco: Denies  Alcohol: Sometimes  Illicit Substances: Denies  Treatment: Denies        Scheduled Meds:    nicotine  1 patch Transdermal Daily      PRN Meds:   Current Facility-Administered Medications:     acetaminophen, 650 mg, Oral, Q6H PRN    aluminum-magnesium hydroxide-simethicone, 30 mL, Oral, Q6H PRN    cloNIDine, 0.1 mg, Oral, Q8H PRN    cloNIDine, 0.2 mg, Oral, Q8H PRN    haloperidoL, 5 mg, Oral, Q6H PRN **AND** diphenhydrAMINE, 50 mg, Oral, Q6H PRN **AND** LORazepam, 2 mg, Oral, Q6H PRN  "**AND** haloperidol lactate, 5 mg, Intramuscular, Q6H PRN **AND** diphenhydrAMINE, 50 mg, Intramuscular, Q6H PRN **AND** lorazepam, 2 mg, Intramuscular, Q6H PRN    hydrOXYzine HCL, 50 mg, Oral, Q4H PRN    magnesium hydroxide 400 mg/5 ml, 30 mL, Oral, Daily PRN    ondansetron, 4 mg, Oral, Q6H PRN    traZODone, 100 mg, Oral, Nightly PRN   Psychotherapeutics (From admission, onward)      Start     Stop Route Frequency Ordered    02/26/25 1619  traZODone tablet 100 mg         -- Oral Nightly PRN 02/26/25 1528    02/26/25 1619  LORazepam tablet 2 mg  (Med - Acute  Behavioral Management)        Placed in "And" Linked Group    -- Oral Every 6 hours PRN 02/26/25 1528    02/26/25 1618  LORazepam injection 2 mg  (Med - Acute  Behavioral Management)        Placed in "And" Linked Group    -- IM Every 6 hours PRN 02/26/25 1528    02/26/25 1618  haloperidol lactate injection 5 mg  (Med - Acute  Behavioral Management)        Placed in "And" Linked Group    -- IM Every 6 hours PRN 02/26/25 1528    02/26/25 1618  haloperidoL tablet 5 mg  (Med - Acute  Behavioral Management)        Placed in "And" Linked Group    -- Oral Every 6 hours PRN 02/26/25 1528              Social History:  Housing Status: Lives with family  Relationship Status/Sexual Orientation: Single   Children: Denies  Education: Some college   Employment Status/Info: [employer]    history: Denies  History of physical/sexual abuse: Denies   Access to gun: Denies       Legal History:   Past Charges/Incarcerations: Denies   Pending charges: Denies      OBJECTIVE:   Medical Review Of Systems:  A comprehensive review of systems was negative.    Vitals   Vitals:    02/27/25 0701   BP: 135/88   Pulse: 67   Resp: 19   Temp: 97.2 °F (36.2 °C)        Labs/Imaging/Studies:   Recent Results (from the past 48 hours)   Drug Screen, Urine    Collection Time: 02/26/25 12:13 PM   Result Value Ref Range    Amphetamines, Urine Negative Negative    Barbiturates, Urine Negative Negative    " Benzodiazepine, Urine Negative Negative    Cannabinoids, Urine Negative Negative    Cocaine, Urine Negative Negative    Fentanyl, Urine Negative Negative    MDMA, Urine Negative Negative    Opiates, Urine Negative Negative    Phencyclidine, Urine Negative Negative    pH, Urine 7.5 3.0 - 11.0    Specific Gravity, Urine Auto 1.022 1.001 - 1.035   Urinalysis, Reflex to Urine Culture    Collection Time: 02/26/25 12:13 PM    Specimen: Urine   Result Value Ref Range    Color, UA Light-Yellow Yellow, Light-Yellow, Dark Yellow, Cherri, Straw    Appearance, UA Turbid (A) Clear    Specific Gravity, UA 1.022 1.005 - 1.030    pH, UA 7.5 5.0 - 8.5    Protein, UA Negative Negative    Glucose, UA Normal Negative, Normal    Ketones, UA Negative Negative    Blood, UA Negative Negative    Bilirubin, UA Negative Negative    Urobilinogen, UA Normal 0.2, 1.0, Normal    Nitrites, UA Negative Negative    Leukocyte Esterase, UA Negative Negative    RBC, UA 0-5 None Seen, 0-2, 3-5, 0-5 /HPF    WBC, UA 0-5 None Seen, 0-2, 3-5, 0-5 /HPF    Bacteria, UA None Seen None Seen /HPF    Squamous Epithelial Cells, UA None Seen None Seen /HPF    Mucous, UA Trace (A) None Seen /LPF    Hyaline Casts, UA None Seen None Seen /lpf   Ethanol    Collection Time: 02/26/25 12:24 PM   Result Value Ref Range    Ethanol Level <10.0 <=10.0 mg/dL   Salicylate Level    Collection Time: 02/26/25 12:24 PM   Result Value Ref Range    Salicylate Level <5.0 (L) 15.0 - 30.0 mg/dL   Acetaminophen Level    Collection Time: 02/26/25 12:24 PM   Result Value Ref Range    Acetaminophen Level <3.0 (L) 10.0 - 30.0 ug/ml   Comprehensive Metabolic Panel    Collection Time: 02/26/25 12:24 PM   Result Value Ref Range    Sodium 141 136 - 145 mmol/L    Potassium 3.5 3.5 - 5.1 mmol/L    Chloride 108 (H) 98 - 107 mmol/L    CO2 27 22 - 29 mmol/L    Glucose 88 74 - 100 mg/dL    Blood Urea Nitrogen 14.3 8.9 - 20.6 mg/dL    Creatinine 0.83 0.72 - 1.25 mg/dL    Calcium 9.2 8.4 - 10.2 mg/dL     "Protein Total 7.3 6.4 - 8.3 gm/dL    Albumin 4.6 3.5 - 5.0 g/dL    Globulin 2.7 2.4 - 3.5 gm/dL    Albumin/Globulin Ratio 1.7 1.1 - 2.0 ratio    Bilirubin Total 0.5 <=1.5 mg/dL    ALP 62 40 - 150 unit/L    ALT 21 0 - 55 unit/L    AST 22 5 - 34 unit/L    eGFR >60 mL/min/1.73/m2    Anion Gap 6.0 mEq/L    BUN/Creatinine Ratio 17    CBC with Differential    Collection Time: 02/26/25 12:24 PM   Result Value Ref Range    WBC 8.10 4.50 - 11.50 x10(3)/mcL    RBC 4.91 4.70 - 6.10 x10(6)/mcL    Hgb 14.5 14.0 - 18.0 g/dL    Hct 42.6 42.0 - 52.0 %    MCV 86.8 80.0 - 94.0 fL    MCH 29.5 27.0 - 31.0 pg    MCHC 34.0 33.0 - 36.0 g/dL    RDW 12.0 11.5 - 17.0 %    Platelet 244 130 - 400 x10(3)/mcL    MPV 9.8 7.4 - 10.4 fL    Neut % 71.5 %    Lymph % 22.3 %    Mono % 4.7 %    Eos % 0.4 %    Basophil % 0.7 %    Imm Grans % 0.4 %    Neut # 5.79 2.1 - 9.2 x10(3)/mcL    Lymph # 1.81 0.6 - 4.6 x10(3)/mcL    Mono # 0.38 0.1 - 1.3 x10(3)/mcL    Eos # 0.03 0 - 0.9 x10(3)/mcL    Baso # 0.06 <=0.2 x10(3)/mcL    Imm Gran # 0.03 0.00 - 0.04 x10(3)/mcL    NRBC% 0.0 %   Hemoglobin A1C    Collection Time: 02/27/25  7:19 AM   Result Value Ref Range    Hemoglobin A1c 5.4 <=7.0 %    Estimated Average Glucose 108.3 mg/dL   Lipid Panel    Collection Time: 02/27/25  7:19 AM   Result Value Ref Range    Cholesterol Total 160 <=200 mg/dL    HDL Cholesterol 67 (H) 35 - 60 mg/dL    Triglyceride 42 34 - 140 mg/dL    Cholesterol/HDL Ratio 2 0 - 5    Very Low Density Lipoprotein 8     LDL Cholesterol 85.00 50.00 - 140.00 mg/dL      No results found for: "PHENYTOIN", "PHENOBARB", "VALPROATE", "CBMZ"        Psychiatric Mental Status Exam:  General Appearance: appears stated age, well developed and nourished, adequately groomed and appropriately dressed, in no acute distress  Arousal: alert with clear sensorium  Behavior: normal; cooperative; reasonably friendly, pleasant, and polite; appropriate eye-contact; under good behavioral control  Movements and Motor " Activity: no abnormal involuntary movements noted; no tics, no tremors, no akathisia, no dystonia, no evidence of tardive dyskinesia; no psychomotor agitation or retardation  Orientation: intact; oriented fully to person, place, time and situation  Speech: delayed, soft, monotone  Mood: Guarded  Affect: constricted  Thought Process: intact, linear, goal-directed, organized, logical  Associations: intact, no loosening of associations  Thought Content and Perceptions: no suicidal or homicidal ideation, no auditory or visual hallucinations, no paranoid ideation, no ideas of reference, no evidence of delusions or psychosis  Recent and Remote Memory: grossly intact, able to recall relevant and salient information from the recent and remote past; per interview/observation with patient  Attention and Concentration: grossly intact, attentive to the conversation and not readily distractible; per interview/observation with patient  Fund of Knowledge: grossly intact, used appropriate vocabulary and demonstrated an awareness of current events; based on history, vocabulary, fund of knowledge, syntax, grammar, and content  Insight: intact; based on understanding of severity of illness and HPI  Judgment: questionable; based on patient's behavior and HPI      Patient Strengths:  Access to care, Able to verbalize needs, Stable physical health, Family/Peer support, Stable employment, and Capable of independent living      Patient Liabilities:  Relationship stressors      Discharge Criteria:  Improved mood and Improved coping skills      Reason for Admission:  The patient poses a significant and immediate danger to self.    ASSESSMENT/PLAN:   Diagnoses:  MOOD DISORDERS; Depressive Disorder NOS (F32.9)       No past medical history on file.       Problem lists and Management Plans:  -Admit to [facility]    Mood   -Will defer medication due to lack of symptoms      -Will attempt to obtain outside psychiatric records if available  -Case  managers to assist with aftercare planning and collateral  -Continue inpatient treatment as evidenced by suicidal ideation      Estimated length of stay: 5-7    Estimated Disposition: Home    Estimated Follow-up: Outpatient medication management          Jd Trivedi [id number]

## 2025-02-27 NOTE — PLAN OF CARE
Problem: Adult Behavioral Health Plan of Care  Goal: Plan of Care Review  2/26/2025 2013 by Royce Zhou RN  Outcome: Unable to Meet  2/26/2025 2012 by Royce Zhou RN  Outcome: Unable to Meet  Goal: Patient-Specific Goal (Individualization)  2/26/2025 2013 by Royce Zhou RN  Outcome: Unable to Meet  2/26/2025 2012 by Royce Zhou RN  Outcome: Unable to Meet  Goal: Adheres to Safety Considerations for Self and Others  2/26/2025 2013 by Royce Zhou RN  Outcome: Unable to Meet  2/26/2025 2012 by Royce Zhou RN  Outcome: Unable to Meet  Goal: Absence of New-Onset Illness or Injury  2/26/2025 2013 by Royce Zhou RN  Outcome: Unable to Meet  2/26/2025 2012 by Royce Zhou RN  Outcome: Unable to Meet  Goal: Optimized Coping Skills in Response to Life Stressors  2/26/2025 2013 by Royce Zhou RN  Outcome: Unable to Meet  2/26/2025 2012 by Royce Zhou RN  Outcome: Unable to Meet  Goal: Develops/Participates in Therapeutic [place] to Support Successful Transition  2/26/2025 2013 by Royce Zhou RN  Outcome: Unable to Meet  2/26/2025 2012 by Royce Zhou RN  Outcome: Unable to Meet  Goal: Rounds/Family Conference  2/26/2025 2013 by Royce Zhou RN  Outcome: Unable to Meet  2/26/2025 2012 by Royce Zhou RN  Outcome: Unable to Meet     Problem: Depressive Signs/Symptoms  Goal: Optimized Energy Level (Depressive Signs/Symptoms)  2/26/2025 2013 by Royce Zhou RN  Outcome: Unable to Meet  2/26/2025 2012 by Royce Zhou RN  Outcome: Unable to Meet  Goal: Optimized Cognitive Function (Depressive Signs/Symptoms)  2/26/2025 2013 by Royce Zhou RN  Outcome: Unable to Meet  2/26/2025 2012 by Royce Zhou RN  Outcome: Unable to Meet  Goal: Increased Participation and Engagement (Depressive Signs/Symptoms)  2/26/2025 2013 by Royce Zhou RN  Outcome: Unable to Meet  2/26/2025 2012 by Royce Zhou RN  Outcome: Unable to Meet  Goal: Enhanced Self-Esteem and Confidence (Depressive Signs/Symptoms)  2/26/2025  2013 by Zhou, Royce, RN  Outcome: Unable to Meet  2/26/2025 2012 by Royce Zhou RN  Outcome: Unable to Meet  Goal: Improved Mood Symptoms (Depressive Signs/Symptoms)  2/26/2025 2013 by Royce Zhou RN  Outcome: Unable to Meet  2/26/2025 2012 by Royce Zhou RN  Outcome: Unable to Meet  Goal: Optimized Nutrition Intake (Depressive Signs/Symptoms)  2/26/2025 2013 by Royce Zhou RN  Outcome: Unable to Meet  2/26/2025 2012 by Royce Zhou RN  Outcome: Unable to Meet  Goal: Improved Psychomotor Symptoms (Depressive Signs/Symptoms)  2/26/2025 2013 by Royce Zhou RN  Outcome: Unable to Meet  2/26/2025 2012 by Royce Zhou RN  Outcome: Unable to Meet  Goal: Improved Sleep (Depressive Signs/Symptoms)  2/26/2025 2013 by Royce Zhou RN  Outcome: Unable to Meet  2/26/2025 2012 by Royce Zhou RN  Outcome: Unable to Meet  Goal: Enhanced Social, Occupational or Functional Skills (Depressive Signs/Symptoms)  2/26/2025 2013 by Royce Zhou RN  Outcome: Unable to Meet  2/26/2025 2012 by Royce Zhou RN  Outcome: Unable to Meet     Problem: Suicide Risk  Goal: Absence of Self-Harm  2/26/2025 2013 by Royce Zhou RN  Outcome: Unable to Meet  2/26/2025 2012 by Royce Zhou RN  Outcome: Unable to Meet     AAOx4 Pt denies SI/HI/AVH. Pt endorses having anxiety and depression, reports poor sleep and good appetite. Pt has poor eye contact and affect is flat. Pt is withdrawn and isolative to self. Q15 min safety checks continued.

## 2025-02-27 NOTE — PROGRESS NOTES
Conflict with other services   02/27/25 1000   [facility] Group Therapy   Group Name Therapeutic Recreation   Specific Interventions Skilled Activity Mild Exercises   Participation Level None

## 2025-02-27 NOTE — PROGRESS NOTES
02/27/25 1500   [facility] Group Therapy   Group Name Therapeutic Recreation   Specific Interventions Skilled Activity Creative Expression   Participation Level Minimal   Participation Quality Withdrawn   Insight/Motivation Limited   Affect/Mood Display Blunted;Flat   Cognition Oriented;Alert   Psychomotor WNL

## 2025-02-27 NOTE — PLAN OF CARE
Problem: Adult Behavioral Health Plan of Care  Goal: Plan of Care Review  Outcome: Progressing  Flowsheets (Taken 2/27/2025 0937)  Patient Agreement with Plan of Care: agrees  Plan of Care Reviewed With: patient  Goal: Patient-Specific Goal (Individualization)  Outcome: Progressing  Flowsheets (Taken 2/27/2025 0937)  Patient Personal Strengths: independent living skills  Patient Vulnerabilities: family/relationship conflict  Goal: Adheres to Safety Considerations for Self and Others  Outcome: Progressing  Flowsheets (Taken 2/27/2025 0937)  Adheres to Safety Considerations for Self and Others: making progress toward outcome  Intervention: Develop and Maintain Individualized Safety Plan  Flowsheets (Taken 2/27/2025 0937)  Safety Measures: safety rounds completed  Goal: Absence of New-Onset Illness or Injury  Outcome: Progressing  Intervention: Identify and Manage Fall Risk  Flowsheets (Taken 2/27/2025 0937)  Safety Measures: safety rounds completed  Intervention: Prevent VTE (Venous Thromboembolism)  Flowsheets (Taken 2/27/2025 0937)  VTE Prevention/Management: fluids promoted  Intervention: Prevent Infection  Flowsheets (Taken 2/27/2025 0937)  Infection Prevention: hand hygiene promoted  Goal: Optimized Coping Skills in Response to Life Stressors  Outcome: Progressing  Flowsheets (Taken 2/27/2025 0937)  Optimized Coping Skills in Response to Life Stressors: making progress toward outcome  Intervention: Promote Effective Coping Strategies  Flowsheets (Taken 2/27/2025 0937)  Supportive Measures: self-care encouraged  Goal: Develops/Participates in Therapeutic [place] to Support Successful Transition  Outcome: Progressing  Flowsheets (Taken 2/27/2025 0937)  Develops/Participates in Therapeutic [place] to Support Successful Transition: making progress toward outcome  Intervention: Foster Therapeutic [place]  Flowsheets (Taken 2/27/2025 0937)  Trust Relationship/Rapport: care explained  Goal: Rounds/Family  Conference  Outcome: Progressing  Flowsheets (Taken 2/27/2025 0937)  Participants:   milieu/psych techs   nursing     Problem: Depressive Signs/Symptoms  Goal: Optimized Energy Level (Depressive Signs/Symptoms)  Outcome: Progressing  Flowsheets (Taken 2/27/2025 0937)  Mutually Determined Action Steps (Optimized Energy Level): grooms self without prompting  Intervention: Optimize Energy Level  Flowsheets (Taken 2/27/2025 0937)  Activity (Behavioral Health): up ad samuel  Patient Performed Hygiene: teeth brushed  Diversional Activity: television  Goal: Optimized Cognitive Function (Depressive Signs/Symptoms)  Outcome: Progressing  Flowsheets (Taken 2/27/2025 0937)  Mutually Determined Action Steps (Optimized Cognitive Function): participates in attention training  Goal: Increased Participation and Engagement (Depressive Signs/Symptoms)  Outcome: Progressing  Flowsheets (Taken 2/27/2025 0937)  Mutually Determined Action Steps (Increased Participation and Engagement): participates in one or more activity  Intervention: Facilitate Participation and Engagement  Flowsheets (Taken 2/27/2025 0937)  Supportive Measures: self-care encouraged  Diversional Activity: television  Goal: Enhanced Self-Esteem and Confidence (Depressive Signs/Symptoms)  Outcome: Progressing  Flowsheets (Taken 2/27/2025 0937)  Mutually Determined Action Steps (Enhanced Self-Esteem and Confidence): identifies judgmental thoughts  Intervention: Promote Confidence and Self-Esteem  Flowsheets (Taken 2/27/2025 0937)  Supportive Measures: self-care encouraged  Goal: Improved Mood Symptoms (Depressive Signs/Symptoms)  Outcome: Progressing  Flowsheets (Taken 2/27/2025 0937)  Mutually Determined Action Steps (Improved Mood Symptoms): acknowledges progress  Intervention: Promote Mood Improvement  Flowsheets (Taken 2/27/2025 0937)  Supportive Measures: self-care encouraged  Diversional Activity: television  Goal: Optimized Nutrition Intake (Depressive  Signs/Symptoms)  Outcome: Progressing  Flowsheets (Taken 2/27/2025 0937)  Mutually Determined Action Steps (Optimized Nutrition Intake): eats at meal time  Intervention: Promote Optimal Nutrition Intake  Flowsheets (Taken 2/27/2025 0937)  Nutrition Interventions: food preferences provided  Bowel Elimination Promotion: adequate fluid intake promoted  Goal: Improved Psychomotor Symptoms (Depressive Signs/Symptoms)  Outcome: Progressing  Flowsheets (Taken 2/27/2025 0937)  Mutually Determined Action Steps (Improved Psychomotor Symptoms): exhibits decrease in agitation  Intervention: Manage Psychomotor Movement  Flowsheets (Taken 2/27/2025 0937)  Activity (Behavioral Health): up ad samuel  Patient Performed Hygiene: teeth brushed  Diversional Activity: television  Goal: Improved Sleep (Depressive Signs/Symptoms)  Outcome: Progressing  Flowsheets (Taken 2/27/2025 0937)  Mutually Determined Action Steps (Improved Sleep): sleeps 4-6 hours at night  Intervention: Promote Healthy Sleep Hygiene  Flowsheets (Taken 2/27/2025 0937)  Sleep Hygiene Promotion: regular sleep pattern promoted  Goal: Enhanced Social, Occupational or Functional Skills (Depressive Signs/Symptoms)  Outcome: Progressing  Flowsheets (Taken 2/27/2025 0937)  Mutually Determined Action Steps (Enhanced Social, Occupational or Functional Skills): participates in social skills training  Intervention: Promote Social, Occupational and Functional Ability  Flowsheets (Taken 2/27/2025 0937)  Social Functional Ability Promotion: self-expression encouraged     Problem: Suicide Risk  Goal: Absence of Self-Harm  Outcome: Progressing  Intervention: Assess Risk to Self and Maintain Safety  Flowsheets (Taken 2/27/2025 0937)  Behavior Management: behavioral plan reviewed  Self-Harm Prevention: environmental self-harm risks assessed  Intervention: Promote Psychosocial Wellbeing  Flowsheets (Taken 2/27/2025 0937)  Sleep/Rest Enhancement: regular sleep/rest pattern  promoted  Supportive Measures: self-care encouraged  Family/Support System Care: self-care encouraged  Intervention: Establish Safety Plan and Continuity of Care  Flowsheets (Taken 2/27/2025 7969)  Safe Transition Promotion: access to lethal means addressed    He is AAO X 4. Denies SI, HI, hallucinations, and delusions at present time. Endorses depression and anxiety. Good eye contact noted. Speech normal tone and speed. Interacts with selected patients and staff. Continue plan of care and provide a safe and therapeutic environment. Continue to monitor every fifteen minutes for safety.

## 2025-02-27 NOTE — PROGRESS NOTES
Daniele is a 25y/o male admitted for Depressive Disorder NOS (F32.9) with a -uds. CTRS met with Pt 1:1, Daniele was soft spoken, withdrawn with no eye contact but cooperative, reported admission as I was depressed and told someone I was going to kill myself, and ability to perform his ADL's. CTRS educated Pt to TR group times and dates with Daniele agreeing to attend TR groups. Daniele reported treatment goal as Stress management.       02/26/25 1608   General   Admit Date 02/26/25   Primary Diagnosis Depressive Disorder NOS (F32.9)   [employer]ism   Number of Children 0   Children Living? 0   Occupation    Does the patient have dentures? No   If you were to take part in activities, which of the following would you prefer? Both   Do you feel like you have enough to keep you busy now? Yes   Do you believe that you have the opportunity for physical activity? Yes   Activity Capabilities Maximum   Subjective   Patient states I was depressed and told someone I was going to kill myself   Assessment   Mobility ambulates independently   Transfers independently   Musculoskeletal   (none reported)   Visual Acuity normal vision   Visual Perception depth perception;color perception;recognizes letters;recognizes numbers   Hearing normal   Speech/Communication normal   Cognitive Concerns oriented x4   Emotional Concerns   (withdrawn and quiet)   Leisure Interest Survey   Leisure Interest Survey Yes   Solitary Activities   Computer Activities Current Interest   Music Listening Current Interest   Reading Current Interest   Physical Activities   Fitness/Exercise Programs Current Interest   Weightlifting Current Interest   Walk/Run Current Interest   Spectator Events   Movies Current Interest   Passive Games   Card Games Current Interest   Goals   Additional Documentation yes   Goal Formulation With patient   Time For Goal Achievement 7 days   Goal 1 Stress management   Goal 1-Progress Ongoing-   Plan    Planned Therapy Intervention Group Recreational Therapy   Expected Length of Stay 5-7days   PT Frequency Minimum of 3 visits per week

## 2025-02-28 PROCEDURE — 11400000 HC PSYCH PRIVATE ROOM

## 2025-02-28 NOTE — PLAN OF CARE
Problem: Adult Behavioral Health Plan of Care  Goal: Plan of Care Review  Outcome: Progressing  Flowsheets (Taken 2/28/2025 0913)  Patient Agreement with Plan of Care: agrees  Plan of Care Reviewed With: patient  Goal: Patient-Specific Goal (Individualization)  Outcome: Progressing  Flowsheets (Taken 2/28/2025 0913)  Patient Personal Strengths: independent living skills  Patient Vulnerabilities: substance abuse/addiction  Goal: Adheres to Safety Considerations for Self and Others  Outcome: Progressing  Flowsheets (Taken 2/28/2025 0913)  Adheres to Safety Considerations for Self and Others: making progress toward outcome  Intervention: Develop and Maintain Individualized Safety Plan  Flowsheets (Taken 2/28/2025 0913)  Safety Measures: safety rounds completed  Goal: Optimized Coping Skills in Response to Life Stressors  Outcome: Progressing  Flowsheets (Taken 2/28/2025 0913)  Optimized Coping Skills in Response to Life Stressors: making progress toward outcome  Intervention: Promote Effective Coping Strategies  Flowsheets (Taken 2/28/2025 0913)  Supportive Measures: self-care encouraged  Goal: Develops/Participates in Therapeutic [place] to Support Successful Transition  Outcome: Progressing  Flowsheets (Taken 2/28/2025 0913)  Develops/Participates in Therapeutic [place] to Support Successful Transition: making progress toward outcome  Intervention: Foster Therapeutic [place]  Flowsheets (Taken 2/28/2025 0913)  Trust Relationship/Rapport: care explained  Goal: Rounds/Family Conference  Outcome: Progressing  Flowsheets (Taken 2/28/2025 0913)  Participants:   milieu/psych techs   nursing     Problem: Depressive Signs/Symptoms  Goal: Optimized Energy Level (Depressive Signs/Symptoms)  Outcome: Progressing  Flowsheets (Taken 2/28/2025 0913)  Mutually Determined Action Steps (Optimized Energy Level): grooms self without prompting  Intervention: Optimize Energy Level  Flowsheets (Taken 2/28/2025 0913)  Activity  (Behavioral Health): up ad samuel  Patient Performed Hygiene: teeth brushed  Diversional Activity: television  Goal: Optimized Cognitive Function (Depressive Signs/Symptoms)  Outcome: Progressing  Flowsheets (Taken 2/28/2025 0913)  Mutually Determined Action Steps (Optimized Cognitive Function): participates in attention training  Goal: Increased Participation and Engagement (Depressive Signs/Symptoms)  Outcome: Progressing  Flowsheets (Taken 2/28/2025 0913)  Mutually Determined Action Steps (Increased Participation and Engagement): participates in one or more activity  Intervention: Facilitate Participation and Engagement  Flowsheets (Taken 2/28/2025 0913)  Supportive Measures: self-care encouraged  Diversional Activity: television  Goal: Enhanced Self-Esteem and Confidence (Depressive Signs/Symptoms)  Outcome: Progressing  Flowsheets (Taken 2/28/2025 0913)  Mutually Determined Action Steps (Enhanced Self-Esteem and Confidence): verbalizes successes  Intervention: Promote Confidence and Self-Esteem  Flowsheets (Taken 2/28/2025 0913)  Supportive Measures: self-care encouraged  Goal: Improved Mood Symptoms (Depressive Signs/Symptoms)  Outcome: Progressing  Flowsheets (Taken 2/28/2025 0913)  Mutually Determined Action Steps (Improved Mood Symptoms): acknowledges progress  Intervention: Promote Mood Improvement  Flowsheets (Taken 2/28/2025 0913)  Supportive Measures: self-care encouraged  Diversional Activity: television  Goal: Optimized Nutrition Intake (Depressive Signs/Symptoms)  Outcome: Progressing  Flowsheets (Taken 2/28/2025 0913)  Mutually Determined Action Steps (Optimized Nutrition Intake): eats at meal time  Intervention: Promote Optimal Nutrition Intake  Flowsheets (Taken 2/28/2025 0913)  Nutrition Interventions: food preferences provided  Bowel Elimination Promotion: adequate fluid intake promoted  Goal: Improved Psychomotor Symptoms (Depressive Signs/Symptoms)  Outcome: Progressing  Flowsheets (Taken  2/28/2025 0913)  Mutually Determined Action Steps (Improved Psychomotor Symptoms): adheres to medication regimen  Intervention: Manage Psychomotor Movement  Flowsheets (Taken 2/28/2025 0913)  Activity (Behavioral Health): up ad samuel  Patient Performed Hygiene: teeth brushed  Diversional Activity: television  Goal: Improved Sleep (Depressive Signs/Symptoms)  Outcome: Progressing  Flowsheets (Taken 2/28/2025 0913)  Mutually Determined Action Steps (Improved Sleep): sleeps 4-6 hours at night  Intervention: Promote Healthy Sleep Hygiene  Flowsheets (Taken 2/28/2025 0913)  Sleep Hygiene Promotion: regular sleep pattern promoted  Goal: Enhanced Social, Occupational or Functional Skills (Depressive Signs/Symptoms)  Outcome: Progressing  Flowsheets (Taken 2/28/2025 0913)  Mutually Determined Action Steps (Enhanced Social, Occupational or Functional Skills): participates in social skills training  Intervention: Promote Social, Occupational and Functional Ability  Flowsheets (Taken 2/28/2025 0913)  Social Functional Ability Promotion: autonomy promoted     Problem: Suicide Risk  Goal: Absence of Self-Harm  Outcome: Progressing  Intervention: Assess Risk to Self and Maintain Safety  Flowsheets (Taken 2/28/2025 0913)  Behavior Management: behavioral plan reviewed  Self-Harm Prevention: environmental self-harm risks assessed  Intervention: Promote Psychosocial Wellbeing  Flowsheets (Taken 2/28/2025 0913)  Sleep/Rest Enhancement: awakenings minimized  Supportive Measures: self-care encouraged  Family/Support System Care: self-care encouraged  Intervention: Establish Safety Plan and Continuity of Care  Flowsheets (Taken 2/28/2025 0913)  Safe Transition Promotion: access to lethal means addressed    He is AAO X 4. Flat affect and blunted mood. Denies SI, HI, hallucinations, and delusions. Good eye contact noted. Speech normal tone and speed. Interacts with selected patients and staff. Continue plan of care and provide a safe and  therapeutic environment. Continue to monitor every fifteen minutes for safety.

## 2025-02-28 NOTE — PROGRESS NOTES
02/28/25 [address] [place] [address] Group Therapy   Group Name Therapeutic Recreation   Specific Interventions Skilled Activity Leisure Education and Awareness   Participation Level Active;Supportive;Appropriate;Attentive;Sharing   Participation Quality Cooperative   Insight/Motivation Limited;Applies New Skills   Affect/Mood Display Appropriate;Blunted   Cognition Oriented;Alert   Psychomotor WNL

## 2025-02-28 NOTE — GROUP NOTE
Group Psychotherapy       Group Focus: Psychodynamic Group Psychotherapy    Explored ineffective coping mechanisms to improve insight       Number of patients in attendance: 7  Group Start Time: 1046  Group End Time:  1131  Groups Date: 2/28/2025  Group Topic:  Behavioral Health  Group Department: [facility]  Group Facilitators:  Do Hairston SSW   _____________________________________________________________________    Patient Name: Daniele Vernon  MRN: [id number]  Patient Class: IP- Psych   Admission Date\Time: 2/26/2025  3:13 PM  Hospital Length of Stay: 2  Primary Care Provider: Esme, Primary Doctor     Referred by: Acute Psychiatry Unit Treatment Team     Target symptoms: Depression     Patient's response to treatment: Active Listening; Patient presented guarded and depressed. Patient though listened to peers and did not disclose.      Progress toward goals: Progressing slowly          Plan: Continue treatment on [facility]    Group co-facilitated with nursing students

## 2025-02-28 NOTE — PLAN OF CARE
Problem: Adult Behavioral Health Plan of Care  Goal: Plan of Care Review  Outcome: Progressing  Flowsheets (Taken 2/27/2025 2100)  Patient Agreement with Plan of Care: agrees  Plan of Care Reviewed With: patient  Goal: Patient-Specific Goal (Individualization)  Outcome: Progressing  Flowsheets (Taken 2/27/2025 2100)  Patient Personal Strengths: independent living skills  Patient Vulnerabilities: substance abuse/addiction  Goal: Adheres to Safety Considerations for Self and Others  Outcome: Progressing  Flowsheets (Taken 2/27/2025 2100)  Adheres to Safety Considerations for Self and Others: making progress toward outcome  Intervention: Develop and Maintain Individualized Safety Plan  Flowsheets (Taken 2/27/2025 2100)  Safety Measures:   safety rounds completed   safety plan reviewed  Goal: Optimized Coping Skills in Response to Life Stressors  Outcome: Progressing  Flowsheets (Taken 2/27/2025 2100)  Optimized Coping Skills in Response to Life Stressors: making progress toward outcome  Intervention: Promote Effective Coping Strategies  Flowsheets (Taken 2/27/2025 2100)  Supportive Measures:   active listening utilized   verbalization of feelings encouraged   self-care encouraged  Goal: Develops/Participates in Therapeutic [place] to Support Successful Transition  Outcome: Progressing  Flowsheets (Taken 2/27/2025 2100)  Develops/Participates in Therapeutic [place] to Support Successful Transition: making progress toward outcome  Intervention: Foster Therapeutic [place]  Flowsheets (Taken 2/27/2025 2100)  Trust Relationship/Rapport:   care explained   questions encouraged   reassurance provided  Goal: Rounds/Family Conference  Outcome: Progressing  Flowsheets (Taken 2/27/2025 2100)  Participants:   milieu/psych techs   nursing     Problem: Depressive Signs/Symptoms  Goal: Optimized Energy Level (Depressive Signs/Symptoms)  Outcome: Progressing  Flowsheets (Taken 2/27/2025 2100)  Mutually Determined Action Steps  (Optimized Energy Level): grooms self without prompting  Intervention: Optimize Energy Level  Flowsheets (Taken 2/27/2025 2100)  Activity (Behavioral Health): up ad samuel  Patient Performed Hygiene: teeth brushed  Diversional Activity: television  Goal: Optimized Cognitive Function (Depressive Signs/Symptoms)  Outcome: Progressing  Flowsheets (Taken 2/27/2025 2100)  Mutually Determined Action Steps (Optimized Cognitive Function): participates in attention training  Goal: Increased Participation and Engagement (Depressive Signs/Symptoms)  Outcome: Progressing  Flowsheets (Taken 2/27/2025 2100)  Mutually Determined Action Steps (Increased Participation and Engagement): participates in one or more activity  Intervention: Facilitate Participation and Engagement  Flowsheets (Taken 2/27/2025 2100)  Supportive Measures:   active listening utilized   verbalization of feelings encouraged   self-care encouraged  Diversional Activity: television  Goal: Enhanced Self-Esteem and Confidence (Depressive Signs/Symptoms)  Outcome: Progressing  Flowsheets (Taken 2/27/2025 2100)  Mutually Determined Action Steps (Enhanced Self-Esteem and Confidence): verbalizes successes  Intervention: Promote Confidence and Self-Esteem  Flowsheets (Taken 2/27/2025 2100)  Supportive Measures:   active listening utilized   verbalization of feelings encouraged   self-care encouraged  Goal: Improved Mood Symptoms (Depressive Signs/Symptoms)  Outcome: Progressing  Flowsheets (Taken 2/27/2025 2100)  Mutually Determined Action Steps (Improved Mood Symptoms): acknowledges progress  Intervention: Promote Mood Improvement  Flowsheets (Taken 2/27/2025 2100)  Supportive Measures:   active listening utilized   verbalization of feelings encouraged   self-care encouraged  Diversional Activity: television  Goal: Optimized Nutrition Intake (Depressive Signs/Symptoms)  Outcome: Progressing  Flowsheets (Taken 2/27/2025 2100)  Mutually Determined Action Steps (Optimized  Nutrition Intake): eats at meal time  Intervention: Promote Optimal Nutrition Intake  Flowsheets (Taken 2/27/2025 2100)  Bowel Elimination Promotion:   adequate fluid intake promoted   ambulation promoted  Goal: Improved Psychomotor Symptoms (Depressive Signs/Symptoms)  Outcome: Progressing  Flowsheets (Taken 2/27/2025 2100)  Mutually Determined Action Steps (Improved Psychomotor Symptoms): adheres to medication regimen  Intervention: Manage Psychomotor Movement  Flowsheets (Taken 2/27/2025 2100)  Activity (Behavioral Health): up ad samuel  Patient Performed Hygiene: teeth brushed  Diversional Activity: television  Goal: Improved Sleep (Depressive Signs/Symptoms)  Outcome: Progressing  Flowsheets (Taken 2/27/2025 2100)  Mutually Determined Action Steps (Improved Sleep): sleeps 4-6 hours at night  Intervention: Promote Healthy Sleep Hygiene  Flowsheets (Taken 2/27/2025 2100)  Sleep Hygiene Promotion:   awakenings minimized   regular sleep pattern promoted  Goal: Enhanced Social, Occupational or Functional Skills (Depressive Signs/Symptoms)  Outcome: Progressing  Flowsheets (Taken 2/27/2025 2100)  Mutually Determined Action Steps (Enhanced Social, Occupational or Functional Skills): participates in social skills training  Intervention: Promote Social, Occupational and Functional Ability  Flowsheets (Taken 2/27/2025 2100)  Social Functional Ability Promotion:   autonomy promoted   self-expression encouraged   social interaction promoted     Problem: Suicide Risk  Goal: Absence of Self-Harm  Outcome: Progressing  Intervention: Assess Risk to Self and Maintain Safety  Flowsheets (Taken 2/27/2025 2100)  Behavior Management: behavioral plan reviewed  Enhanced Safety Measures: monitored by video  Self-Harm Prevention: environmental self-harm risks assessed  Intervention: Promote Psychosocial Wellbeing  Flowsheets (Taken 2/27/2025 2100)  Sleep/Rest Enhancement: awakenings minimized  Supportive Measures:   active listening  utilized   verbalization of feelings encouraged   self-care encouraged  Family/Support System Care: self-care encouraged  Intervention: Establish Safety Plan and Continuity of Care  Flowsheets (Taken 2/27/2025 2100)  Safe Transition Promotion: access to lethal means addressed     AAOx4 Pt denies SI/HI/AVH. Pt denies having anxiety and depression, reports good sleep and good appetite. Pt has good eye contact and affect is appropriate. Q15 min safety checks continued.

## 2025-02-28 NOTE — PROGRESS NOTES
"2/28/2025  Daniele Vernon   2000   [id number]        Psychiatry Progress Note     Chief Complaint: OK    SUBJECTIVE:   Daniele Vernon is a 24 y.o. male  placed under a Physician's Emergency Certificate at [facility] with suicidal ideations without a plan.     Today patient presents calm and constricted. States that he is feeling a little depressed. Patient continues to deny the need to begin medication and given the level of his current mood I don't disagree. Discussed the need for counseling and patient was amenable to this. Staff reports that patient is denying all symptoms. Will continue to monitor and treat as necessary.        Current Medications:   Scheduled Meds:    nicotine  1 patch Transdermal Daily      PRN Meds:   Current Facility-Administered Medications:     acetaminophen, 650 mg, Oral, Q6H PRN    aluminum-magnesium hydroxide-simethicone, 30 mL, Oral, Q6H PRN    cloNIDine, 0.1 mg, Oral, Q8H PRN    cloNIDine, 0.2 mg, Oral, Q8H PRN    haloperidoL, 5 mg, Oral, Q6H PRN **AND** diphenhydrAMINE, 50 mg, Oral, Q6H PRN **AND** LORazepam, 2 mg, Oral, Q6H PRN **AND** haloperidol lactate, 5 mg, Intramuscular, Q6H PRN **AND** diphenhydrAMINE, 50 mg, Intramuscular, Q6H PRN **AND** lorazepam, 2 mg, Intramuscular, Q6H PRN    hydrOXYzine HCL, 50 mg, Oral, Q4H PRN    magnesium hydroxide 400 mg/5 ml, 30 mL, Oral, Daily PRN    ondansetron, 4 mg, Oral, Q6H PRN    traZODone, 100 mg, Oral, Nightly PRN   Psychotherapeutics (From admission, onward)      Start     Stop Route Frequency Ordered    02/26/25 1619  traZODone tablet 100 mg         -- Oral Nightly PRN 02/26/25 1528    02/26/25 1619  LORazepam tablet 2 mg  (Med - Acute  Behavioral Management)        Placed in "And" Linked Group    -- Oral Every 6 hours PRN 02/26/25 1528    02/26/25 1618  LORazepam injection 2 mg  (Med - Acute  Behavioral Management)        Placed in "And" Linked Group    -- IM Every 6 hours PRN 02/26/25 1528    02/26/25 1618  haloperidol lactate " "injection 5 mg  (Med - Acute  Behavioral Management)        Placed in "And" Linked Group    -- IM Every 6 hours PRN 02/26/25 1528    02/26/25 1618  haloperidoL tablet 5 mg  (Med - Acute  Behavioral Management)        Placed in "And" Linked Group    -- Oral Every 6 hours PRN 02/26/25 1528            Allergies:   Review of patient's allergies indicates:  No Known Allergies     OBJECTIVE:   Vitals   Vitals:    02/28/25 0701   BP: 130/75   Pulse: 80   Resp: 17   Temp:         Labs/Imaging/Studies:   Recent Results (from the past 36 hours)   Hemoglobin A1C    Collection Time: 02/27/25  7:19 AM   Result Value Ref Range    Hemoglobin A1c 5.4 <=7.0 %    Estimated Average Glucose 108.3 mg/dL   Lipid Panel    Collection Time: 02/27/25  7:19 AM   Result Value Ref Range    Cholesterol Total 160 <=200 mg/dL    HDL Cholesterol 67 (H) 35 - 60 mg/dL    Triglyceride 42 34 - 140 mg/dL    Cholesterol/HDL Ratio 2 0 - 5    Very Low Density Lipoprotein 8     LDL Cholesterol 85.00 50.00 - 140.00 mg/dL   SYPHILIS ANTIBODY (WITH REFLEX RPR)    Collection Time: 02/27/25  7:19 AM   Result Value Ref Range    Syphilis Antibody Nonreactive Nonreactive, Equivocal          Medical Review Of Systems:  A comprehensive review of systems was negative.      Psychiatric Mental Status Exam:  General Appearance: appears stated age, well developed and nourished, adequately groomed and appropriately dressed, in no acute distress  Arousal: alert with clear sensorium  Behavior: normal; cooperative; reasonably friendly, pleasant, and polite; appropriate eye-contact; under good behavioral control  Movements and Motor Activity: no abnormal involuntary movements noted; no tics, no tremors, no akathisia, no dystonia, no evidence of tardive dyskinesia; no psychomotor agitation or retardation  Orientation: intact; oriented fully to person, place, time and situation  Speech: delayed, soft, monotone  Mood: Guarded  Affect: constricted  Thought Process: intact, linear, " goal-directed, organized, logical  Associations: intact, no loosening of associations  Thought Content and Perceptions: no suicidal or homicidal ideation, no auditory or visual hallucinations, no paranoid ideation, no ideas of reference, no evidence of delusions or psychosis  Recent and Remote Memory: grossly intact, able to recall relevant and salient information from the recent and remote past; per interview/observation with patient  Attention and Concentration: grossly intact, attentive to the conversation and not readily distractible; per interview/observation with patient  Fund of Knowledge: grossly intact, used appropriate vocabulary and demonstrated an awareness of current events; based on history, vocabulary, fund of knowledge, syntax, grammar, and content  Insight: intact; based on understanding of severity of illness and HPI  Judgment: questionable; based on patient's behavior and HPI    ASSESSMENT/PLAN:   Problems Addressed/Diagnoses:  Depressive Disorder NOS (F32.9)     No past medical history on file.     Plan:  Mood   -Will defer medication due to lack of symptoms     Expected Disposition Plan: Home        Jd Trivedi [id number]

## 2025-02-28 NOTE — PROGRESS NOTES
02/28/25 1000   [facility] Group Therapy   Group Name Therapeutic Recreation   Specific Interventions Skilled Activity Mild Exercises   Participation Level None   Participation Quality Conflict w/ Other;Services

## 2025-03-01 PROCEDURE — 11400000 HC PSYCH PRIVATE ROOM

## 2025-03-01 NOTE — PLAN OF CARE
Problem: Adult Behavioral Health Plan of Care  Goal: Plan of Care Review  Outcome: Progressing  Flowsheets (Taken 2/28/2025 2100)  Patient Agreement with Plan of Care: agrees  Plan of Care Reviewed With: patient  Goal: Patient-Specific Goal (Individualization)  Outcome: Progressing  Flowsheets (Taken 2/28/2025 2100)  Patient Personal Strengths: independent living skills  Patient Vulnerabilities: family/relationship conflict  Goal: Adheres to Safety Considerations for Self and Others  Outcome: Progressing  Flowsheets (Taken 2/28/2025 2100)  Adheres to Safety Considerations for Self and Others: making progress toward outcome  Intervention: Develop and Maintain Individualized Safety Plan  Flowsheets (Taken 2/28/2025 2100)  Safety Measures:   safety plan reviewed   safety rounds completed  Goal: Optimized Coping Skills in Response to Life Stressors  Outcome: Progressing  Flowsheets (Taken 2/28/2025 2100)  Optimized Coping Skills in Response to Life Stressors: making progress toward outcome  Intervention: Promote Effective Coping Strategies  Flowsheets (Taken 2/28/2025 2100)  Supportive Measures:   active listening utilized   verbalization of feelings encouraged   self-care encouraged  Goal: Develops/Participates in Therapeutic [place] to Support Successful Transition  Outcome: Progressing  Flowsheets (Taken 2/28/2025 2100)  Develops/Participates in Therapeutic [place] to Support Successful Transition: making progress toward outcome  Intervention: Foster Therapeutic [place]  Flowsheets (Taken 2/28/2025 2100)  Trust Relationship/Rapport:   care explained   questions encouraged   reassurance provided  Goal: Rounds/Family Conference  Outcome: Progressing  Flowsheets (Taken 2/28/2025 2100)  Participants:   nursing   milieu/psych techs     Problem: Depressive Signs/Symptoms  Goal: Optimized Energy Level (Depressive Signs/Symptoms)  Outcome: Progressing  Flowsheets (Taken 2/28/2025 2100)  Mutually Determined Action Steps  (Optimized Energy Level): grooms self without prompting  Intervention: Optimize Energy Level  Flowsheets (Taken 2/28/2025 2100)  Activity (Behavioral Health): up ad samuel  Patient Performed Hygiene: teeth brushed  Diversional Activity: television  Goal: Optimized Cognitive Function (Depressive Signs/Symptoms)  Outcome: Progressing  Flowsheets (Taken 2/28/2025 2100)  Mutually Determined Action Steps (Optimized Cognitive Function): participates in attention training  Goal: Increased Participation and Engagement (Depressive Signs/Symptoms)  Outcome: Progressing  Flowsheets (Taken 2/28/2025 2100)  Mutually Determined Action Steps (Increased Participation and Engagement): participates in one or more activity  Intervention: Facilitate Participation and Engagement  Flowsheets (Taken 2/28/2025 2100)  Supportive Measures:   active listening utilized   verbalization of feelings encouraged   self-care encouraged  Diversional Activity: television  Goal: Enhanced Self-Esteem and Confidence (Depressive Signs/Symptoms)  Outcome: Progressing  Flowsheets (Taken 2/28/2025 2100)  Mutually Determined Action Steps (Enhanced Self-Esteem and Confidence): verbalizes successes  Intervention: Promote Confidence and Self-Esteem  Flowsheets (Taken 2/28/2025 2100)  Supportive Measures:   active listening utilized   verbalization of feelings encouraged   self-care encouraged  Goal: Improved Mood Symptoms (Depressive Signs/Symptoms)  Outcome: Progressing  Flowsheets (Taken 2/28/2025 2100)  Mutually Determined Action Steps (Improved Mood Symptoms): acknowledges progress  Intervention: Promote Mood Improvement  Flowsheets (Taken 2/28/2025 2100)  Supportive Measures:   active listening utilized   verbalization of feelings encouraged   self-care encouraged  Diversional Activity: television  Goal: Optimized Nutrition Intake (Depressive Signs/Symptoms)  Outcome: Progressing  Flowsheets (Taken 2/28/2025 2100)  Mutually Determined Action Steps (Optimized  Nutrition Intake): eats at meal time  Intervention: Promote Optimal Nutrition Intake  Flowsheets (Taken 2/28/2025 2100)  Bowel Elimination Promotion:   adequate fluid intake promoted   ambulation promoted  Goal: Improved Psychomotor Symptoms (Depressive Signs/Symptoms)  Outcome: Progressing  Flowsheets (Taken 2/28/2025 2100)  Mutually Determined Action Steps (Improved Psychomotor Symptoms): adheres to medication regimen  Intervention: Manage Psychomotor Movement  Flowsheets (Taken 2/28/2025 2100)  Activity (Behavioral Health): up ad samuel  Patient Performed Hygiene: teeth brushed  Diversional Activity: television  Goal: Improved Sleep (Depressive Signs/Symptoms)  Outcome: Progressing  Flowsheets (Taken 2/28/2025 2100)  Mutually Determined Action Steps (Improved Sleep): sleeps 4-6 hours at night  Intervention: Promote Healthy Sleep Hygiene  Flowsheets (Taken 2/28/2025 2100)  Sleep Hygiene Promotion:   awakenings minimized   regular sleep pattern promoted  Goal: Enhanced Social, Occupational or Functional Skills (Depressive Signs/Symptoms)  Outcome: Progressing  Flowsheets (Taken 2/28/2025 2100)  Mutually Determined Action Steps (Enhanced Social, Occupational or Functional Skills): participates in social skills training  Intervention: Promote Social, Occupational and Functional Ability  Flowsheets (Taken 2/28/2025 2100)  Social Functional Ability Promotion:   autonomy promoted   self-expression encouraged     Problem: Suicide Risk  Goal: Absence of Self-Harm  Outcome: Progressing  Intervention: Assess Risk to Self and Maintain Safety  Flowsheets (Taken 2/28/2025 2100)  Behavior Management: behavioral plan reviewed  Enhanced Safety Measures: monitored by video  Self-Harm Prevention: environmental self-harm risks assessed  Intervention: Promote Psychosocial Wellbeing  Flowsheets (Taken 2/28/2025 2100)  Sleep/Rest Enhancement: awakenings minimized  Supportive Measures:   active listening utilized   verbalization of  feelings encouraged   self-care encouraged  Family/Support System Care: self-care encouraged  Intervention: Establish Safety Plan and Continuity of Care  Flowsheets (Taken 2/28/2025 2100)  Safe Transition Promotion: access to lethal means addressed     AAOx4 Pt denies SI/HI/AVH. Pt denies having anxiety and depression, reports good sleep and good appetite. Pt has good eye contact and affect is appropriate. Q15 min safety checks continued.

## 2025-03-01 NOTE — PLAN OF CARE
Problem: Adult Behavioral Health Plan of Care  Goal: Plan of Care Review  Outcome: Progressing  Flowsheets (Taken 3/1/2025 1131)  Patient Agreement with Plan of Care: agrees  Plan of Care Reviewed With: patient  Goal: Patient-Specific Goal (Individualization)  Outcome: Progressing  Flowsheets (Taken 3/1/2025 1131)  Patient Personal Strengths: independent living skills  Patient Vulnerabilities: family/relationship conflict  Goal: Adheres to Safety Considerations for Self and Others  Outcome: Progressing  Flowsheets (Taken 3/1/2025 1131)  Adheres to Safety Considerations for Self and Others: making progress toward outcome  Intervention: Develop and Maintain Individualized Safety Plan  Flowsheets (Taken 3/1/2025 1131)  Safety Measures: safety rounds completed  Goal: Optimized Coping Skills in Response to Life Stressors  Outcome: Progressing  Flowsheets (Taken 3/1/2025 1131)  Optimized Coping Skills in Response to Life Stressors: making progress toward outcome  Intervention: Promote Effective Coping Strategies  Flowsheets (Taken 3/1/2025 1131)  Supportive Measures: self-reflection promoted  Goal: Develops/Participates in Therapeutic [place] to Support Successful Transition  Outcome: Progressing  Flowsheets (Taken 3/1/2025 1131)  Develops/Participates in Therapeutic [place] to Support Successful Transition: making progress toward outcome  Intervention: Foster Therapeutic [place]  Flowsheets (Taken 3/1/2025 1131)  Trust Relationship/Rapport: care explained  Goal: Rounds/Family Conference  Outcome: Progressing  Flowsheets (Taken 3/1/2025 1131)  Participants:   milieu/psych techs   nursing     Problem: Depressive Signs/Symptoms  Goal: Optimized Energy Level (Depressive Signs/Symptoms)  Outcome: Progressing  Flowsheets (Taken 3/1/2025 1131)  Mutually Determined Action Steps (Optimized Energy Level): grooms self without prompting  Intervention: Optimize Energy Level  Flowsheets (Taken 3/1/2025 1131)  Activity (Behavioral  Health): up ad samuel  Patient Performed Hygiene: teeth brushed  Diversional Activity: television  Goal: Optimized Cognitive Function (Depressive Signs/Symptoms)  Outcome: Progressing  Flowsheets (Taken 3/1/2025 1131)  Mutually Determined Action Steps (Optimized Cognitive Function): participates in attention training  Goal: Increased Participation and Engagement (Depressive Signs/Symptoms)  Outcome: Progressing  Flowsheets (Taken 3/1/2025 1131)  Mutually Determined Action Steps (Increased Participation and Engagement): participates in one or more activity  Intervention: Facilitate Participation and Engagement  Flowsheets (Taken 3/1/2025 1131)  Supportive Measures: self-reflection promoted  Diversional Activity: television  Goal: Enhanced Self-Esteem and Confidence (Depressive Signs/Symptoms)  Outcome: Progressing  Flowsheets (Taken 3/1/2025 1131)  Mutually Determined Action Steps (Enhanced Self-Esteem and Confidence): verbalizes successes  Intervention: Promote Confidence and Self-Esteem  Flowsheets (Taken 3/1/2025 1131)  Supportive Measures: self-reflection promoted  Goal: Improved Mood Symptoms (Depressive Signs/Symptoms)  Outcome: Progressing  Flowsheets (Taken 3/1/2025 1131)  Mutually Determined Action Steps (Improved Mood Symptoms): acknowledges progress  Intervention: Promote Mood Improvement  Flowsheets (Taken 3/1/2025 1131)  Supportive Measures: self-reflection promoted  Diversional Activity: television  Goal: Optimized Nutrition Intake (Depressive Signs/Symptoms)  Outcome: Progressing  Flowsheets (Taken 3/1/2025 1131)  Mutually Determined Action Steps (Optimized Nutrition Intake): eats at meal time  Intervention: Promote Optimal Nutrition Intake  Flowsheets (Taken 3/1/2025 1131)  Nutrition Interventions: food preferences provided  Bowel Elimination Promotion: adequate fluid intake promoted  Goal: Improved Psychomotor Symptoms (Depressive Signs/Symptoms)  Outcome: Progressing  Flowsheets (Taken 3/1/2025  1131)  Mutually Determined Action Steps (Improved Psychomotor Symptoms): adheres to medication regimen  Intervention: Manage Psychomotor Movement  Flowsheets (Taken 3/1/2025 1131)  Activity (Behavioral Health): up ad samuel  Patient Performed Hygiene: teeth brushed  Diversional Activity: television  Goal: Improved Sleep (Depressive Signs/Symptoms)  Outcome: Progressing  Flowsheets (Taken 3/1/2025 1131)  Mutually Determined Action Steps (Improved Sleep): sleeps 4-6 hours at night  Intervention: Promote Healthy Sleep Hygiene  Flowsheets (Taken 3/1/2025 1131)  Sleep Hygiene Promotion: regular sleep pattern promoted  Goal: Enhanced Social, Occupational or Functional Skills (Depressive Signs/Symptoms)  Outcome: Progressing  Flowsheets (Taken 3/1/2025 1131)  Mutually Determined Action Steps (Enhanced Social, Occupational or Functional Skills): participates in social skills training  Intervention: Promote Social, Occupational and Functional Ability  Flowsheets (Taken 3/1/2025 1131)  Social Functional Ability Promotion: autonomy promoted     Problem: Suicide Risk  Goal: Absence of Self-Harm  Outcome: Progressing  Intervention: Assess Risk to Self and Maintain Safety  Flowsheets (Taken 3/1/2025 1131)  Behavior Management: behavioral plan reviewed  Self-Harm Prevention: environmental self-harm risks assessed  Intervention: Promote Psychosocial Wellbeing  Flowsheets (Taken 3/1/2025 1131)  Sleep/Rest Enhancement: regular sleep/rest pattern promoted  Supportive Measures: self-reflection promoted  Family/Support System Care: self-care encouraged  Intervention: Establish Safety Plan and Continuity of Care  Flowsheets (Taken 3/1/2025 1131)  Safe Transition Promotion: access to lethal means addressed    He is AAO X 4. Denies SI, HI, hallucinations, and delusions. Good eye contact noted. Speech normal tone and speed. Interacts with selected patients and staff. Continue plan of care and provide a safe and therapeutic environment.  Continue to monitor every fifteen minutes for safety.

## 2025-03-02 PROCEDURE — 11400000 HC PSYCH PRIVATE ROOM

## 2025-03-02 NOTE — PLAN OF CARE
Problem: Depressive Signs/Symptoms  Goal: Optimized Energy Level (Depressive Signs/Symptoms)  Outcome: Progressing  Flowsheets (Taken 3/2/2025 1010)  Mutually Determined Action Steps (Optimized Energy Level): grooms self without prompting  Intervention: Optimize Energy Level  Flowsheets (Taken 3/2/2025 1010)  Activity (Behavioral Health): up ad samuel  Patient Performed Hygiene: teeth brushed  Diversional Activity: television  Goal: Optimized Cognitive Function (Depressive Signs/Symptoms)  Outcome: Progressing  Flowsheets (Taken 3/2/2025 1010)  Mutually Determined Action Steps (Optimized Cognitive Function): participates in attention training  Goal: Increased Participation and Engagement (Depressive Signs/Symptoms)  Outcome: Progressing  Flowsheets (Taken 3/2/2025 1010)  Mutually Determined Action Steps (Increased Participation and Engagement): participates in one or more activity  Intervention: Facilitate Participation and Engagement  Flowsheets (Taken 3/2/2025 1010)  Supportive Measures:   active listening utilized   verbalization of feelings encouraged   self-care encouraged  Diversional Activity: television  Goal: Enhanced Self-Esteem and Confidence (Depressive Signs/Symptoms)  Outcome: Progressing  Flowsheets (Taken 3/2/2025 1010)  Mutually Determined Action Steps (Enhanced Self-Esteem and Confidence): verbalizes successes  Intervention: Promote Confidence and Self-Esteem  Flowsheets (Taken 3/2/2025 1010)  Supportive Measures:   active listening utilized   verbalization of feelings encouraged   self-care encouraged  Goal: Improved Mood Symptoms (Depressive Signs/Symptoms)  Outcome: Progressing  Flowsheets (Taken 3/2/2025 1010)  Mutually Determined Action Steps (Improved Mood Symptoms): acknowledges progress  Intervention: Promote Mood Improvement  Flowsheets (Taken 3/2/2025 1010)  Supportive Measures:   active listening utilized   verbalization of feelings encouraged   self-care encouraged  Diversional Activity:  television  Goal: Optimized Nutrition Intake (Depressive Signs/Symptoms)  Outcome: Progressing  Flowsheets (Taken 3/2/2025 1010)  Mutually Determined Action Steps (Optimized Nutrition Intake): eats at meal time  Intervention: Promote Optimal Nutrition Intake  Flowsheets (Taken 3/2/2025 1010)  Nutrition Interventions: food preferences provided  Bowel Elimination Promotion: adequate fluid intake promoted  Goal: Improved Psychomotor Symptoms (Depressive Signs/Symptoms)  Outcome: Progressing  Flowsheets (Taken 3/2/2025 1010)  Mutually Determined Action Steps (Improved Psychomotor Symptoms): adheres to medication regimen  Intervention: Manage Psychomotor Movement  Flowsheets (Taken 3/2/2025 1010)  Activity (Behavioral Health): up ad samuel  Patient Performed Hygiene: teeth brushed  Diversional Activity: television  Goal: Improved Sleep (Depressive Signs/Symptoms)  Outcome: Progressing  Flowsheets (Taken 3/2/2025 1010)  Mutually Determined Action Steps (Improved Sleep): sleeps 4-6 hours at night  Intervention: Promote Healthy Sleep Hygiene  Flowsheets (Taken 3/2/2025 1010)  Sleep Hygiene Promotion:   awakenings minimized   regular sleep pattern promoted  Goal: Enhanced Social, Occupational or Functional Skills (Depressive Signs/Symptoms)  Outcome: Progressing  Flowsheets (Taken 3/2/2025 1010)  Mutually Determined Action Steps (Enhanced Social, Occupational or Functional Skills): participates in social skills training  Intervention: Promote Social, Occupational and Functional Ability  Flowsheets (Taken 3/2/2025 1010)  Social Functional Ability Promotion:   autonomy promoted   self-expression encouraged   social interaction promoted     Problem: Suicide Risk  Goal: Absence of Self-Harm  Outcome: Progressing  Intervention: Assess Risk to Self and Maintain Safety  Flowsheets (Taken 3/2/2025 1010)  Behavior Management: behavioral plan reviewed  Enhanced Safety Measures: monitored by video  Self-Harm Prevention: environmental  self-harm risks assessed  Intervention: Promote Psychosocial Wellbeing  Flowsheets (Taken 3/2/2025 1010)  Sleep/Rest Enhancement: awakenings minimized  Supportive Measures:   active listening utilized   verbalization of feelings encouraged   self-care encouraged  Family/Support System Care: self-care encouraged  Intervention: Establish Safety Plan and Continuity of Care  Flowsheets (Taken 3/2/2025 1010)  Safe Transition Promotion: access to lethal means addressed

## 2025-03-02 NOTE — NURSING
"Daily Nursing Note:      Behavior:    Patient (Daniele Vernon is a 24 y.o. male, : 2000, MRN: [id number]) demonstrating an affect that was congruent. Daniele demonstrating mood that is normal. Daniele had an appearance that was clean and good hygiene. Daniele denies suicidal ideation. Daniele denies suicide plan. Daniele denies homicidal ideation. Daniele denies hallucinations.    Daniele's  height is 5' 6" (1.676 m) and weight is 54.4 kg (120 lb). His oral temperature is 97.7 °F (36.5 °C). His blood pressure is 155/61 (abnormal) and his pulse is 81. His respiration is 20 and oxygen saturation is 100%.     Intervention:    Encourage Daniele to perform self-hygiene, grooming, and changing of clothing. Monitor Daniele's behavior and program compliance. Monitor Daniele for suicidal ideation, homicidal ideation, sleep disturbance, and hallucinations. Encourage Daniele to eat all portions of meals and assess for meal preferences. Monitor Daniele for intake and output to ensure hydration. Notify the Physician/Physician Assistant/Advance Practice Registered Nurse (MD/PA/APRN) for any medication refusal and any change in patient condition.      Response:    Daniele verbalizes understand of unit process and procedures.     Plan:     Continue to monitor per MD/PA/APRN orders; maintain patient safety.   "

## 2025-03-02 NOTE — PLAN OF CARE
Problem: Depressive Signs/Symptoms  Goal: Optimized Energy Level (Depressive Signs/Symptoms)  Outcome: Progressing  Flowsheets (Taken 3/1/2025 2039)  Mutually Determined Action Steps (Optimized Energy Level): grooms self without prompting  Intervention: Optimize Energy Level  Flowsheets (Taken 3/1/2025 2039)  Activity (Behavioral Health): up ad samuel  Patient Performed Hygiene: teeth brushed  Diversional Activity: television  Goal: Optimized Cognitive Function (Depressive Signs/Symptoms)  Outcome: Progressing  Flowsheets (Taken 3/1/2025 2039)  Mutually Determined Action Steps (Optimized Cognitive Function): participates in attention training  Goal: Increased Participation and Engagement (Depressive Signs/Symptoms)  Outcome: Progressing  Flowsheets (Taken 3/1/2025 2039)  Mutually Determined Action Steps (Increased Participation and Engagement): participates in one or more activity  Intervention: Facilitate Participation and Engagement  Flowsheets (Taken 3/1/2025 2039)  Supportive Measures:   active listening utilized   verbalization of feelings encouraged   self-care encouraged  Diversional Activity: television  Goal: Enhanced Self-Esteem and Confidence (Depressive Signs/Symptoms)  Outcome: Progressing  Flowsheets (Taken 3/1/2025 2039)  Mutually Determined Action Steps (Enhanced Self-Esteem and Confidence): verbalizes successes  Intervention: Promote Confidence and Self-Esteem  Flowsheets (Taken 3/1/2025 2039)  Supportive Measures:   active listening utilized   verbalization of feelings encouraged   self-care encouraged  Goal: Improved Mood Symptoms (Depressive Signs/Symptoms)  Outcome: Progressing  Flowsheets (Taken 3/1/2025 2039)  Mutually Determined Action Steps (Improved Mood Symptoms): acknowledges progress  Intervention: Promote Mood Improvement  Flowsheets (Taken 3/1/2025 2039)  Supportive Measures:   active listening utilized   verbalization of feelings encouraged   self-care encouraged  Diversional Activity:  television  Goal: Optimized Nutrition Intake (Depressive Signs/Symptoms)  Outcome: Progressing  Flowsheets (Taken 3/1/2025 2039)  Mutually Determined Action Steps (Optimized Nutrition Intake): eats at meal time  Intervention: Promote Optimal Nutrition Intake  Flowsheets (Taken 3/1/2025 2039)  Bowel Elimination Promotion:   adequate fluid intake promoted   ambulation promoted  Goal: Improved Psychomotor Symptoms (Depressive Signs/Symptoms)  Outcome: Progressing  Flowsheets (Taken 3/1/2025 2039)  Mutually Determined Action Steps (Improved Psychomotor Symptoms): adheres to medication regimen  Intervention: Manage Psychomotor Movement  Flowsheets (Taken 3/1/2025 2039)  Activity (Behavioral Health): up ad samuel  Patient Performed Hygiene: teeth brushed  Diversional Activity: television  Goal: Improved Sleep (Depressive Signs/Symptoms)  Outcome: Progressing  Flowsheets (Taken 3/1/2025 2039)  Mutually Determined Action Steps (Improved Sleep): sleeps 4-6 hours at night  Intervention: Promote Healthy Sleep Hygiene  Flowsheets (Taken 3/1/2025 2039)  Sleep Hygiene Promotion:   awakenings minimized   regular sleep pattern promoted  Goal: Enhanced Social, Occupational or Functional Skills (Depressive Signs/Symptoms)  Outcome: Progressing  Flowsheets (Taken 3/1/2025 2039)  Mutually Determined Action Steps (Enhanced Social, Occupational or Functional Skills): participates in social skills training  Intervention: Promote Social, Occupational and Functional Ability  Flowsheets (Taken 3/1/2025 2039)  Social Functional Ability Promotion:   autonomy promoted   self-expression encouraged   social interaction promoted     Problem: Suicide Risk  Goal: Absence of Self-Harm  Outcome: Progressing  Intervention: Assess Risk to Self and Maintain Safety  Flowsheets (Taken 3/1/2025 2039)  Behavior Management: behavioral plan reviewed  Enhanced Safety Measures: monitored by video  Self-Harm Prevention: environmental self-harm risks  assessed  Intervention: Promote Psychosocial Wellbeing  Flowsheets (Taken 3/1/2025 2039)  Sleep/Rest Enhancement: awakenings minimized  Supportive Measures:   active listening utilized   verbalization of feelings encouraged   self-care encouraged  Family/Support System Care: self-care encouraged  Intervention: Establish Safety Plan and Continuity of Care  Flowsheets (Taken 3/1/2025 2039)  Safe Transition Promotion: access to lethal means addressed     AAOx4 Pt denies SI/HI/AVH. Pt denies having anxiety and depression, reports good sleep and good appetite. Pt has good eye contact and affect is appropriate. Q15 min safety checks continued.

## 2025-03-03 PROBLEM — F32.9 MAJOR DEPRESSIVE DISORDER, SINGLE EPISODE, UNSPECIFIED: Status: ACTIVE | Noted: 2025-03-03

## 2025-03-03 PROCEDURE — 11400000 HC PSYCH PRIVATE ROOM

## 2025-03-03 NOTE — PLAN OF CARE
Problem: Depressive Signs/Symptoms  Goal: Optimized Energy Level (Depressive Signs/Symptoms)  Outcome: Progressing  Flowsheets (Taken 3/2/2025 1944)  Mutually Determined Action Steps (Optimized Energy Level): grooms self without prompting  Intervention: Optimize Energy Level  Flowsheets (Taken 3/2/2025 1944)  Activity (Behavioral Health): up ad samuel  Patient Performed Hygiene: teeth brushed  Diversional Activity: television  Goal: Optimized Cognitive Function (Depressive Signs/Symptoms)  Outcome: Progressing  Flowsheets (Taken 3/2/2025 1944)  Mutually Determined Action Steps (Optimized Cognitive Function): participates in attention training  Goal: Increased Participation and Engagement (Depressive Signs/Symptoms)  Outcome: Progressing  Flowsheets (Taken 3/2/2025 1944)  Mutually Determined Action Steps (Increased Participation and Engagement): participates in one or more activity  Intervention: Facilitate Participation and Engagement  Flowsheets (Taken 3/2/2025 1944)  Supportive Measures:   verbalization of feelings encouraged   self-care encouraged   active listening utilized  Diversional Activity: television  Goal: Enhanced Self-Esteem and Confidence (Depressive Signs/Symptoms)  Outcome: Progressing  Flowsheets (Taken 3/2/2025 1944)  Mutually Determined Action Steps (Enhanced Self-Esteem and Confidence): verbalizes successes  Intervention: Promote Confidence and Self-Esteem  Flowsheets (Taken 3/2/2025 1944)  Supportive Measures:   verbalization of feelings encouraged   self-care encouraged   active listening utilized  Goal: Improved Mood Symptoms (Depressive Signs/Symptoms)  Outcome: Progressing  Flowsheets (Taken 3/2/2025 1944)  Mutually Determined Action Steps (Improved Mood Symptoms): acknowledges progress  Intervention: Promote Mood Improvement  Flowsheets (Taken 3/2/2025 1944)  Supportive Measures:   verbalization of feelings encouraged   self-care encouraged   active listening utilized  Diversional Activity:  television  Goal: Optimized Nutrition Intake (Depressive Signs/Symptoms)  Outcome: Progressing  Flowsheets (Taken 3/2/2025 1944)  Mutually Determined Action Steps (Optimized Nutrition Intake): eats at meal time  Intervention: Promote Optimal Nutrition Intake  Flowsheets (Taken 3/2/2025 1944)  Bowel Elimination Promotion:   adequate fluid intake promoted   ambulation promoted  Goal: Improved Psychomotor Symptoms (Depressive Signs/Symptoms)  Outcome: Progressing  Flowsheets (Taken 3/2/2025 1944)  Mutually Determined Action Steps (Improved Psychomotor Symptoms): adheres to medication regimen  Intervention: Manage Psychomotor Movement  Flowsheets (Taken 3/2/2025 1944)  Activity (Behavioral Health): up ad samuel  Patient Performed Hygiene: teeth brushed  Diversional Activity: television  Goal: Improved Sleep (Depressive Signs/Symptoms)  Outcome: Progressing  Flowsheets (Taken 3/2/2025 1944)  Mutually Determined Action Steps (Improved Sleep): sleeps 4-6 hours at night  Intervention: Promote Healthy Sleep Hygiene  Flowsheets (Taken 3/2/2025 1944)  Sleep Hygiene Promotion:   awakenings minimized   regular sleep pattern promoted  Goal: Enhanced Social, Occupational or Functional Skills (Depressive Signs/Symptoms)  Outcome: Progressing  Flowsheets (Taken 3/2/2025 1944)  Mutually Determined Action Steps (Enhanced Social, Occupational or Functional Skills): participates in social skills training  Intervention: Promote Social, Occupational and Functional Ability  Flowsheets (Taken 3/2/2025 1944)  Social Functional Ability Promotion:   autonomy promoted   self-expression encouraged   social interaction promoted     Problem: Suicide Risk  Goal: Absence of Self-Harm  Outcome: Progressing  Intervention: Assess Risk to Self and Maintain Safety  Flowsheets (Taken 3/2/2025 1944)  Behavior Management: behavioral plan reviewed  Enhanced Safety Measures: monitored by video  Self-Harm Prevention: environmental self-harm risks  assessed  Intervention: Promote Psychosocial Wellbeing  Flowsheets (Taken 3/2/2025 1944)  Sleep/Rest Enhancement: awakenings minimized  Supportive Measures:   verbalization of feelings encouraged   self-care encouraged   active listening utilized  Family/Support System Care: self-care encouraged  Intervention: Establish Safety Plan and Continuity of Care  Flowsheets (Taken 3/2/2025 1944)  Safe Transition Promotion: access to lethal means addressed     AAOx4 Pt denies SI/HI/AVH. Pt denies having anxiety and depression, reports good sleep and good appetite. Pt has good eye contact and affect is appropriate. Q15 min safety checks continued.

## 2025-03-03 NOTE — PLAN OF CARE
Problem: Depressive Signs/Symptoms  Goal: Optimized Energy Level (Depressive Signs/Symptoms)  Outcome: Progressing  Flowsheets (Taken 3/3/2025 0909)  Mutually Determined Action Steps (Optimized Energy Level): grooms self without prompting  Intervention: Optimize Energy Level  Flowsheets (Taken 3/3/2025 0909)  Activity (Behavioral Health): up ad samuel  Patient Performed Hygiene: teeth brushed  Diversional Activity: television  Goal: Optimized Cognitive Function (Depressive Signs/Symptoms)  Outcome: Progressing  Flowsheets (Taken 3/3/2025 0909)  Mutually Determined Action Steps (Optimized Cognitive Function): participates in attention training  Goal: Increased Participation and Engagement (Depressive Signs/Symptoms)  Outcome: Progressing  Flowsheets (Taken 3/3/2025 0909)  Mutually Determined Action Steps (Increased Participation and Engagement): participates in one or more activity  Intervention: Facilitate Participation and Engagement  Flowsheets (Taken 3/3/2025 0909)  Supportive Measures:   verbalization of feelings encouraged   self-reflection promoted   self-care encouraged   active listening utilized  Diversional Activity: television  Goal: Enhanced Self-Esteem and Confidence (Depressive Signs/Symptoms)  Outcome: Progressing  Flowsheets (Taken 3/3/2025 0909)  Mutually Determined Action Steps (Enhanced Self-Esteem and Confidence): verbalizes successes  Intervention: Promote Confidence and Self-Esteem  Flowsheets (Taken 3/3/2025 0909)  Supportive Measures:   verbalization of feelings encouraged   self-reflection promoted   self-care encouraged   active listening utilized  Goal: Improved Mood Symptoms (Depressive Signs/Symptoms)  Outcome: Progressing  Flowsheets (Taken 3/3/2025 0909)  Mutually Determined Action Steps (Improved Mood Symptoms): acknowledges progress  Intervention: Promote Mood Improvement  Flowsheets (Taken 3/3/2025 0909)  Supportive Measures:   verbalization of feelings encouraged   self-reflection  promoted   self-care encouraged   active listening utilized  Diversional Activity: television  Goal: Optimized Nutrition Intake (Depressive Signs/Symptoms)  Outcome: Progressing  Flowsheets (Taken 3/3/2025 0909)  Mutually Determined Action Steps (Optimized Nutrition Intake): eats at meal time  Intervention: Promote Optimal Nutrition Intake  Flowsheets (Taken 3/3/2025 0909)  Nutrition Interventions: food preferences provided  Bowel Elimination Promotion: adequate fluid intake promoted  Goal: Improved Psychomotor Symptoms (Depressive Signs/Symptoms)  Outcome: Progressing  Flowsheets (Taken 3/3/2025 0909)  Mutually Determined Action Steps (Improved Psychomotor Symptoms): adheres to medication regimen  Intervention: Manage Psychomotor Movement  Flowsheets (Taken 3/3/2025 0909)  Activity (Behavioral Health): up ad samuel  Patient Performed Hygiene: teeth brushed  Diversional Activity: television  Goal: Improved Sleep (Depressive Signs/Symptoms)  Outcome: Progressing  Flowsheets (Taken 3/3/2025 0909)  Mutually Determined Action Steps (Improved Sleep): sleeps 4-6 hours at night  Intervention: Promote Healthy Sleep Hygiene  Flowsheets (Taken 3/3/2025 0909)  Sleep Hygiene Promotion:   awakenings minimized   regular sleep pattern promoted  Goal: Enhanced Social, Occupational or Functional Skills (Depressive Signs/Symptoms)  Outcome: Progressing  Flowsheets (Taken 3/3/2025 0909)  Mutually Determined Action Steps (Enhanced Social, Occupational or Functional Skills): participates in social skills training  Intervention: Promote Social, Occupational and Functional Ability  Flowsheets (Taken 3/3/2025 0909)  Social Functional Ability Promotion:   autonomy promoted   self-expression encouraged     Problem: Suicide Risk  Goal: Absence of Self-Harm  Outcome: Met  Intervention: Assess Risk to Self and Maintain Safety  Flowsheets (Taken 3/3/2025 0909)  Behavior Management: behavioral plan reviewed  Enhanced Safety Measures: monitored by  video  Self-Harm Prevention: environmental self-harm risks assessed  Intervention: Promote Psychosocial Wellbeing  Flowsheets (Taken 3/3/2025 0909)  Sleep/Rest Enhancement: awakenings minimized  Supportive Measures:   verbalization of feelings encouraged   self-reflection promoted   self-care encouraged   active listening utilized  Family/Support System Care: self-care encouraged  Intervention: Establish Safety Plan and Continuity of Care  Flowsheets (Taken 3/3/2025 0909)  Safe Transition Promotion: access to lethal means addressed

## 2025-03-03 NOTE — PLAN OF CARE
Sw and Patient spoke with patient's mother, jozef. She will be providing transportation at discharge.

## 2025-03-03 NOTE — GROUP NOTE
Group Psychotherapy       Group Focus: Psychodynamic Group Psychotherapy     Explored re-framing thoughts to improve insight and wellbeing      Number of patients in attendance: 6  Group Start Time: 1100  Group End Time:  1145  Groups Date: 3/3/2025  Group Topic:  Behavioral Health  Group Department: [facility]  Group Facilitators:  Do Hairston SSW   _____________________________________________________________________    Patient Name: Daniele Vernon  MRN: [id number]  Patient Class: IP- Psych   Admission Date\Time: 2/26/2025  3:13 PM  Hospital Length of Stay: 5  Primary Care Provider: Esme, Primary Doctor     Referred by: Acute Psychiatry Unit Treatment Team     Target symptoms: Mood Disorder     Patient's response to treatment: Active Listening: patient presented calm and cooperative. Patient verbalized that his father can be a negative influence on his thoughts due to how he makes him feel.      Progress toward goals: Progressing well          Plan: Continue treatment on APU

## 2025-03-03 NOTE — PROGRESS NOTES
03/03/25 1000   [facility] Group Therapy   Group Name Therapeutic Recreation   Specific Interventions Skilled Activity Mild Exercises   Participation Level Active;Supportive;Appropriate;Attentive;Sharing   Participation Quality Cooperative;Withdrawn   Insight/Motivation Improved;Applies New Skills   Affect/Mood Display Appropriate;Blunted;Flat   Cognition Oriented;Alert   Psychomotor WNL

## 2025-03-03 NOTE — PROGRESS NOTES
Relaxation Education: Aromatherapy and Sound Therapy; Co-facilitated with Mary Ellen Vu LPN    03/03/25 1500   [facility] Group Therapy   Group Name Therapeutic Recreation   Specific Interventions Relaxation Training   Participation Level Active;Supportive;Appropriate;Attentive;Sharing   Participation Quality Cooperative;Social   Insight/Motivation Good;Applies New Skills   Affect/Mood Display Appropriate;Bright   Cognition Oriented;Alert   Psychomotor WNL

## 2025-03-03 NOTE — NURSING
"Daily Nursing Note:      Behavior:    Patient (Daniele Vernon is a 24 y.o. male, : 2000, MRN: [id number]) demonstrating an affect that was sad and anxious. Daniele demonstrating mood that is depressed. Daniele had an appearance that was clean and good hygiene. Daniele denies suicidal ideation. Daniele denies suicide plan. Daniele denies homicidal ideation. Daniele denies hallucinations.    Daniele's  height is 5' 6" (1.676 m) and weight is 56.7 kg (125 lb). His temperature is 97.4 °F (36.3 °C). His blood pressure is 137/75 and his pulse is 76. His respiration is 18 and oxygen saturation is 100%.       Intervention:    Encourage Daniele to perform self-hygiene, grooming, and changing of clothing. Monitor Daniele's behavior and program compliance. Monitor Daniele for suicidal ideation, homicidal ideation, sleep disturbance, and hallucinations. Encourage Daniele to eat all portions of meals and assess for meal preferences. Monitor Daniele for intake and output to ensure hydration. Notify the Physician/Physician Assistant/Advance Practice Registered Nurse (MD/PA/APRN) for any medication refusal and any change in patient condition.      Response:    Daniele verbalizes understand of unit process and procedures. Daniele denied the need of  medications .    Plan:     Continue to monitor per MD/PA/APRN orders; maintain patient safety.   "

## 2025-03-03 NOTE — PROGRESS NOTES
"3/3/2025  Daniele Vernon   2000   [id number]        Psychiatry Progress Note     Chief Complaint: "It's going all right"    SUBJECTIVE:   Daniele Vernon is a 24 y.o. male placed under a Physician's Emergency Certificate at [facility] with suicidal ideations without a plan.     This morning, he states that he is "all right."  Suicidal ideations have improved.  He is not currently on any psychiatric medications and is deferring medication this morning.  No acute complaints.  No overt behavioral issues reported by staff.  Plans to return home on discharge.  Will plan for discharge in the AM.      UDS: (-)  Blood alcohol: <10      Current Medications:   Scheduled Meds:    nicotine  1 patch Transdermal Daily      PRN Meds:   Current Facility-Administered Medications:     acetaminophen, 650 mg, Oral, Q6H PRN    aluminum-magnesium hydroxide-simethicone, 30 mL, Oral, Q6H PRN    cloNIDine, 0.1 mg, Oral, Q8H PRN    cloNIDine, 0.2 mg, Oral, Q8H PRN    haloperidoL, 5 mg, Oral, Q6H PRN **AND** diphenhydrAMINE, 50 mg, Oral, Q6H PRN **AND** LORazepam, 2 mg, Oral, Q6H PRN **AND** haloperidol lactate, 5 mg, Intramuscular, Q6H PRN **AND** diphenhydrAMINE, 50 mg, Intramuscular, Q6H PRN **AND** lorazepam, 2 mg, Intramuscular, Q6H PRN    hydrOXYzine HCL, 50 mg, Oral, Q4H PRN    magnesium hydroxide 400 mg/5 ml, 30 mL, Oral, Daily PRN    ondansetron, 4 mg, Oral, Q6H PRN    traZODone, 100 mg, Oral, Nightly PRN   Psychotherapeutics (From admission, onward)      Start     Stop Route Frequency Ordered    02/26/25 1619  traZODone tablet 100 mg         -- Oral Nightly PRN 02/26/25 1528    02/26/25 1619  LORazepam tablet 2 mg  (Med - Acute  Behavioral Management)        Placed in "And" Linked Group    -- Oral Every 6 hours PRN 02/26/25 1528    02/26/25 1618  LORazepam injection 2 mg  (Med - Acute  Behavioral Management)        Placed in "And" Linked Group    -- IM Every 6 hours PRN 02/26/25 1528    02/26/25 1618  haloperidol lactate injection " "5 mg  (Med - Acute  Behavioral Management)        Placed in "And" Linked Group    -- IM Every 6 hours PRN 02/26/25 1528    02/26/25 1618  haloperidoL tablet 5 mg  (Med - Acute  Behavioral Management)        Placed in "And" Linked Group    -- Oral Every 6 hours PRN 02/26/25 1528            Allergies:   Review of patient's allergies indicates:  No Known Allergies     OBJECTIVE:   Vitals   Vitals:    03/03/25 0701   BP: 137/75   Pulse: 76   Resp: 18   Temp: 97.4 °F (36.3 °C)        Labs/Imaging/Studies:   No results found for this or any previous visit (from the past 36 hours).       Medical Review Of Systems:  Constitutional: negative  Respiratory: negative  Cardiovascular: negative  Gastrointestinal: negative  Genitourinary:negative  Musculoskeletal:negative  Neurological: negative       Psychiatric Mental Status Exam:  General Appearance: appears stated age, well-developed, well-nourished  Arousal: alert  Behavior: cooperative  Movements and Motor Activity: no abnormal involuntary movements noted  Orientation: oriented to person, place, time, and situation  Speech: normal rate, normal rhythm, normal volume, normal tone  Mood: "All right"  Affect: constricted  Thought Process: linear  Associations: intact  Thought Content and Perceptions: suicidal ideation improved, no homicidal ideation, no auditory hallucinations, no visual hallucinations, no paranoid ideation, no ideas of reference  Recent and Remote Memory: recent memory intact, remote memory intact; per interview/observation with patient  Attention and Concentration: intact, attentive to conversation; per interview/observation with patient  Fund of Knowledge: intact, aware of current events, vocabulary appropriate; based on history, vocabulary, fund of knowledge, syntax, grammar, and content  Insight: questionable; based on understanding of severity of illness and HPI  Judgment: questionable; based on patient's behavior and HPI     ASSESSMENT/PLAN:   Problems " Addressed/Diagnoses:  Depressive Disorder (F32.9)      No past medical history on file.     Plan:  Depression, acute  -Patient deferring medication    Expected Disposition Plan: Home        Pierre Roy M.D.

## 2025-03-04 VITALS
SYSTOLIC BLOOD PRESSURE: 119 MMHG | HEIGHT: 66 IN | BODY MASS INDEX: 20.09 KG/M2 | WEIGHT: 125 LBS | TEMPERATURE: 97 F | DIASTOLIC BLOOD PRESSURE: 73 MMHG | HEART RATE: 68 BPM | OXYGEN SATURATION: 99 % | RESPIRATION RATE: 19 BRPM

## 2025-03-04 NOTE — NURSING
Discharge Note:    Daniele Vernon is a 24 y.o. male, : 2000, MRN: [id number], admitted on 2025 for Pierre Roy MD with a diagnosis of Depression [F32.A].    Patient discharged on 3/4/2025 per physician orders in stable condition. Patient denied suicidal ideation, homicidal ideation, or hallucinations. Patient was discharged with valuables, personal belongings, prescriptions, discharge instructions, printed Warning Sings of Self-Harm pursuant to Act 737 and, an educational handout explaining the diagnosis and prescribed medications. Patient verbalized understanding of the discharge instructions and importance of follow-up visits. Patient was escorted out of the facility by [facility] and placed into a private vehicle to be transported to home.     Patient discharged on the following medications:     Medication List        STOP taking these medications      ciprofloxacin HCl 0.3 % ophthalmic solution  Commonly known as: CILOXAN

## 2025-03-04 NOTE — PLAN OF CARE
Problem: Depressive Signs/Symptoms  Goal: Optimized Energy Level (Depressive Signs/Symptoms)  Outcome: Met  Goal: Optimized Cognitive Function (Depressive Signs/Symptoms)  Outcome: Met  Goal: Increased Participation and Engagement (Depressive Signs/Symptoms)  Outcome: Met  Goal: Enhanced Self-Esteem and Confidence (Depressive Signs/Symptoms)  Outcome: Met  Goal: Improved Mood Symptoms (Depressive Signs/Symptoms)  Outcome: Met  Goal: Optimized Nutrition Intake (Depressive Signs/Symptoms)  Outcome: Met  Goal: Improved Psychomotor Symptoms (Depressive Signs/Symptoms)  Outcome: Met  Goal: Improved Sleep (Depressive Signs/Symptoms)  Outcome: Met  Goal: Enhanced Social, Occupational or Functional Skills (Depressive Signs/Symptoms)  Outcome: Met     AAOx4 Pt denies SI/HI/AVH. Pt denies having anxiety and depression, reports good sleep and good appetite. Pt has good eye contact and affect is appropriate. Q15 min safety checks continued.

## 2025-03-06 NOTE — DISCHARGE SUMMARY
DISCHARGE SUMMARY  PSYCHIATRY      Admit Date: 2/26/2025  3:13 PM    Discharge Date:  3/4/2025    SITE:   [facility]    Discharge Attending Physician: Pierre Roy M.D.    Chief Complaint:  I got into an argument with my girlfriend and said I was going to kill myself     History of Present Illness On Admit:   Daniele Vernon is a 24 y.o. male placed under a Physician's Emergency Certificate at [facility] with suicidal ideations without a plan.      Patient presents to the exam room calm and polite. Patient was withdrawn and quiet but denies any depression, anxiety, thoughts of self harm or harm to others. Patient denies any history of mental illness. Patient denies any history of psychotropic medication or feels the need to begin treatment at this time. Patient endorses that he said he was going to harm himself after the argument with his girlfriend because he was mad. I will admit for safety monitoring.      Admit Mental Status Exam:  General Appearance: appears stated age, well developed and nourished, adequately groomed and appropriately dressed, in no acute distress  Arousal: alert with clear sensorium  Behavior: normal; cooperative; reasonably friendly, pleasant, and polite; appropriate eye-contact; under good behavioral control  Movements and Motor Activity: no abnormal involuntary movements noted; no tics, no tremors, no akathisia, no dystonia, no evidence of tardive dyskinesia; no psychomotor agitation or retardation  Orientation: intact; oriented fully to person, place, time and situation  Speech: delayed, soft, monotone  Mood: Guarded  Affect: constricted  Thought Process: intact, linear, goal-directed, organized, logical  Associations: intact, no loosening of associations  Thought Content and Perceptions: no suicidal or homicidal ideation, no auditory or visual hallucinations, no paranoid ideation, no ideas of reference, no evidence of  "delusions or psychosis  Recent and Remote Memory: grossly intact, able to recall relevant and salient information from the recent and remote past; per interview/observation with patient  Attention and Concentration: grossly intact, attentive to the conversation and not readily distractible; per interview/observation with patient  Fund of Knowledge: grossly intact, used appropriate vocabulary and demonstrated an awareness of current events; based on history, vocabulary, fund of knowledge, syntax, grammar, and content  Insight: intact; based on understanding of severity of illness and HPI  Judgment: questionable; based on patient's behavior and HPI      Diagnoses:  PRINCIPAL PROBLEM:  Major depressive disorder, single episode, unspecified      PROBLEM LIST    Major depressive disorder, single episode, unspecified        Hospital Course:   Patient was admitted to [facility] but deferred psychiatric medications.    2/28/25  Today patient presents calm and constricted. States that he is feeling a little depressed. Patient continues to deny the need to begin medication and given the level of his current mood I don't disagree. Discussed the need for counseling and patient was amenable to this. Staff reports that patient is denying all symptoms. Will continue to monitor and treat as necessary.       3/3/25  This morning, he states that he is "all right."  Suicidal ideations have improved.  He is not currently on any psychiatric medications and is deferring medication this morning.  No acute complaints.  No overt behavioral issues reported by staff.  Plans to return home on discharge.  Will plan for discharge in the AM.        UDS: (-)  Blood alcohol: <10        Current Medications:   Scheduled Meds:        DISCHARGE EXAMINATION    VITALS   Vitals:    03/01/25 1930 03/02/25 0701 03/03/25 0701 03/04/25 0701   BP: (!) 155/61 116/72 137/75 119/73   BP Location: Left arm      Pulse: 81 60 76 68   Resp: 20 16 18 19   Temp: 97.7 °F " "(36.5 °C) 97.6 °F (36.4 °C) 97.4 °F (36.3 °C) 97.3 °F (36.3 °C)   TempSrc: Oral      SpO2: 100% 100% 100% 99%   Weight: 56.7 kg (125 lb)      Height:             Discharge Mental Status Exam:  General Appearance: appears stated age, well-developed, well-nourished  Arousal: alert  Behavior: cooperative  Movements and Motor Activity: no abnormal involuntary movements noted  Orientation: oriented to person, place, time, and situation  Speech: normal rate, normal rhythm, normal volume, normal tone  Mood: "All right"  Affect: constricted  Thought Process: linear  Associations: intact  Thought Content and Perceptions: suicidal ideation improved, no homicidal ideation, no auditory hallucinations, no visual hallucinations, no paranoid ideation, no ideas of reference  Recent and Remote Memory: recent memory intact, remote memory intact; per interview/observation with patient  Attention and Concentration: intact, attentive to conversation; per interview/observation with patient  Fund of Knowledge: intact, aware of current events, vocabulary appropriate; based on history, vocabulary, fund of knowledge, syntax, grammar, and content  Insight: questionable; based on understanding of severity of illness and HPI  Judgment: questionable; based on patient's behavior and HPI      Discharge Condition:  Stable    Prognosis:  Fair    Justification for multiple antipsychotics:  N/a    Disposition:  discharged to home    Follow-up:   Follow-up Information       Tess Crouch, NP Follow up.    Specialty: Psychiatry  Why: March 20th 10:30am  Contact information:  [address] [place] [id number]  [phone]               [facility].    Specialty: Smoking Cessation  Why: pt is not interested in smoking cessation because he does not smoke  Contact information:  [facility], [address]  [facility] [address] [phone]  Additional information:  [facility] [address]                           Medication " Regimen:  Current Medications[1]      Patient Instructions:   Continue medication regimen as prescribed.    Disposition plan per  - see  notes for details.    Patient instructed to call 911 or present to emergency department if any of the following complications develop status post discharge: suicidality, homicidality, or grave disability.     Total time spent discharging patient: <30 minutes      Pierre Roy M.D.       [1] No current facility-administered medications for this encounter.  No current outpatient medications on file.